# Patient Record
Sex: MALE | Race: BLACK OR AFRICAN AMERICAN | NOT HISPANIC OR LATINO
[De-identification: names, ages, dates, MRNs, and addresses within clinical notes are randomized per-mention and may not be internally consistent; named-entity substitution may affect disease eponyms.]

---

## 2018-04-03 PROBLEM — Z00.00 ENCOUNTER FOR PREVENTIVE HEALTH EXAMINATION: Status: ACTIVE | Noted: 2018-04-03

## 2018-04-19 ENCOUNTER — APPOINTMENT (OUTPATIENT)
Dept: ENDOCRINOLOGY | Facility: CLINIC | Age: 78
End: 2018-04-19

## 2021-04-22 ENCOUNTER — APPOINTMENT (OUTPATIENT)
Dept: SURGERY | Facility: CLINIC | Age: 81
End: 2021-04-22
Payer: MEDICARE

## 2021-04-22 VITALS
HEART RATE: 89 BPM | DIASTOLIC BLOOD PRESSURE: 70 MMHG | WEIGHT: 232 LBS | SYSTOLIC BLOOD PRESSURE: 143 MMHG | HEIGHT: 66 IN | BODY MASS INDEX: 37.28 KG/M2 | TEMPERATURE: 96.9 F | OXYGEN SATURATION: 96 %

## 2021-04-22 DIAGNOSIS — Z86.79 PERSONAL HISTORY OF OTHER DISEASES OF THE CIRCULATORY SYSTEM: ICD-10-CM

## 2021-04-22 DIAGNOSIS — Z82.49 FAMILY HISTORY OF ISCHEMIC HEART DISEASE AND OTHER DISEASES OF THE CIRCULATORY SYSTEM: ICD-10-CM

## 2021-04-22 DIAGNOSIS — Z86.39 PERSONAL HISTORY OF OTHER ENDOCRINE, NUTRITIONAL AND METABOLIC DISEASE: ICD-10-CM

## 2021-04-22 DIAGNOSIS — Z78.9 OTHER SPECIFIED HEALTH STATUS: ICD-10-CM

## 2021-04-22 DIAGNOSIS — L02.213 CUTANEOUS ABSCESS OF CHEST WALL: ICD-10-CM

## 2021-04-22 DIAGNOSIS — Z87.891 PERSONAL HISTORY OF NICOTINE DEPENDENCE: ICD-10-CM

## 2021-04-22 PROCEDURE — 99203 OFFICE O/P NEW LOW 30 MIN: CPT

## 2021-04-22 PROCEDURE — 99072 ADDL SUPL MATRL&STAF TM PHE: CPT

## 2021-04-22 RX ORDER — AMLODIPINE BESYLATE 10 MG/1
10 TABLET ORAL
Qty: 90 | Refills: 0 | Status: ACTIVE | COMMUNITY
Start: 2021-04-14

## 2021-04-22 RX ORDER — ATORVASTATIN CALCIUM 80 MG/1
80 TABLET, FILM COATED ORAL
Qty: 90 | Refills: 0 | Status: ACTIVE | COMMUNITY
Start: 2021-02-25

## 2021-04-22 RX ORDER — HYDROCHLOROTHIAZIDE 25 MG/1
25 TABLET ORAL
Qty: 90 | Refills: 0 | Status: ACTIVE | COMMUNITY
Start: 2021-04-12

## 2021-04-22 RX ORDER — METFORMIN HYDROCHLORIDE 1000 MG/1
1000 TABLET, COATED ORAL
Qty: 180 | Refills: 0 | Status: ACTIVE | COMMUNITY
Start: 2021-04-09

## 2021-04-22 RX ORDER — DAPAGLIFLOZIN 10 MG/1
10 TABLET, FILM COATED ORAL
Qty: 30 | Refills: 0 | Status: ACTIVE | COMMUNITY
Start: 2021-04-08

## 2021-04-22 RX ORDER — MELOXICAM 7.5 MG/1
7.5 TABLET ORAL
Qty: 30 | Refills: 0 | Status: ACTIVE | COMMUNITY
Start: 2021-03-02

## 2021-04-22 RX ORDER — KETOROLAC TROMETHAMINE 5 MG/ML
0.5 SOLUTION OPHTHALMIC
Qty: 5 | Refills: 0 | Status: ACTIVE | COMMUNITY
Start: 2021-04-02

## 2021-04-22 RX ORDER — CEPHALEXIN 500 MG/1
500 CAPSULE ORAL
Qty: 14 | Refills: 0 | Status: ACTIVE | COMMUNITY
Start: 2021-04-13

## 2021-04-22 RX ORDER — METOPROLOL SUCCINATE 50 MG/1
50 TABLET, EXTENDED RELEASE ORAL
Qty: 90 | Refills: 0 | Status: ACTIVE | COMMUNITY
Start: 2021-02-28

## 2021-04-22 RX ORDER — BLOOD SUGAR DIAGNOSTIC
STRIP MISCELLANEOUS
Qty: 100 | Refills: 0 | Status: ACTIVE | COMMUNITY
Start: 2021-02-05

## 2021-04-22 RX ORDER — ALBUTEROL SULFATE 90 UG/1
108 (90 BASE) INHALANT RESPIRATORY (INHALATION)
Qty: 7 | Refills: 0 | Status: ACTIVE | COMMUNITY
Start: 2021-02-11

## 2021-04-22 RX ORDER — PREDNISOLONE ACETATE 10 MG/ML
1 SUSPENSION/ DROPS OPHTHALMIC
Qty: 15 | Refills: 0 | Status: ACTIVE | COMMUNITY
Start: 2021-02-17

## 2021-04-22 RX ORDER — OFLOXACIN 3 MG/ML
0.3 SOLUTION/ DROPS OPHTHALMIC
Qty: 5 | Refills: 0 | Status: ACTIVE | COMMUNITY
Start: 2021-04-02

## 2021-04-22 RX ORDER — LOSARTAN POTASSIUM 100 MG/1
100 TABLET, FILM COATED ORAL
Qty: 90 | Refills: 0 | Status: ACTIVE | COMMUNITY
Start: 2021-02-06

## 2021-04-22 RX ORDER — TIZANIDINE 4 MG/1
4 TABLET ORAL
Qty: 30 | Refills: 0 | Status: ACTIVE | COMMUNITY
Start: 2021-03-02

## 2021-04-22 NOTE — HISTORY OF PRESENT ILLNESS
[de-identified] : right axillary abscess [de-identified] : 80 year old black male who presents c/o a right axillary abscess he has had for the past few weeks. Pt states he has had chronic drainage, without fevers or chills.

## 2021-04-22 NOTE — PHYSICAL EXAM
[Normal Breath Sounds] : Normal breath sounds [Normal Heart Sounds] : normal heart sounds [Purpura] : no purpura  [Petechiae] : no petechiae [Skin Ulcer] : ulcer [Skin Induration] : induration [Alert] : alert [Oriented to Person] : oriented to person [Oriented to Place] : oriented to place [Oriented to Time] : oriented to time [Calm] : calm [de-identified] : well developed black male in no acute distress [de-identified] : noninjected and nonicteric [de-identified] : witout adenopathy [de-identified] : without 4 by 4 cm abscess cavity that was opened and draining [de-identified] : benign [de-identified] : iwthout calf painor swelling

## 2021-04-29 ENCOUNTER — APPOINTMENT (OUTPATIENT)
Dept: SURGERY | Facility: CLINIC | Age: 81
End: 2021-04-29
Payer: MEDICARE

## 2021-04-29 VITALS — TEMPERATURE: 96.8 F

## 2021-04-29 PROCEDURE — 99072 ADDL SUPL MATRL&STAF TM PHE: CPT

## 2021-04-29 PROCEDURE — 99212 OFFICE O/P EST SF 10 MIN: CPT

## 2021-04-29 NOTE — PHYSICAL EXAM
[Normal Breath Sounds] : Normal breath sounds [Normal Heart Sounds] : normal heart sounds [Calm] : calm [de-identified] : well developed black male in no acute distress [de-identified] : abscess opened and closing, without erythema [de-identified] : benign [de-identified] : without hernia

## 2021-04-29 NOTE — HISTORY OF PRESENT ILLNESS
[de-identified] : right axillary abscess [de-identified] : pt improved, decreased swelling, decreased drainage, denies fevers or chills.

## 2021-05-03 ENCOUNTER — INPATIENT (INPATIENT)
Facility: HOSPITAL | Age: 81
LOS: 3 days | Discharge: HOME | DRG: 872 | End: 2021-05-07
Attending: INTERNAL MEDICINE | Admitting: INTERNAL MEDICINE
Payer: MEDICARE

## 2021-05-03 VITALS
TEMPERATURE: 97 F | SYSTOLIC BLOOD PRESSURE: 99 MMHG | HEIGHT: 67 IN | WEIGHT: 225.09 LBS | HEART RATE: 99 BPM | OXYGEN SATURATION: 95 % | DIASTOLIC BLOOD PRESSURE: 63 MMHG | RESPIRATION RATE: 26 BRPM

## 2021-05-03 DIAGNOSIS — K81.9 CHOLECYSTITIS, UNSPECIFIED: ICD-10-CM

## 2021-05-03 LAB
ALBUMIN SERPL ELPH-MCNC: 2.7 G/DL — LOW (ref 3.3–5)
ALP SERPL-CCNC: 130 U/L — HIGH (ref 40–120)
ALT FLD-CCNC: 23 U/L — SIGNIFICANT CHANGE UP (ref 12–78)
ANION GAP SERPL CALC-SCNC: 14 MMOL/L — SIGNIFICANT CHANGE UP (ref 5–17)
APPEARANCE UR: ABNORMAL
APTT BLD: 29.6 SEC — SIGNIFICANT CHANGE UP (ref 27.5–35.5)
AST SERPL-CCNC: 30 U/L — SIGNIFICANT CHANGE UP (ref 15–37)
BASOPHILS # BLD AUTO: 0.02 K/UL — SIGNIFICANT CHANGE UP (ref 0–0.2)
BASOPHILS NFR BLD AUTO: 0.1 % — SIGNIFICANT CHANGE UP (ref 0–2)
BILIRUB SERPL-MCNC: 1.2 MG/DL — SIGNIFICANT CHANGE UP (ref 0.2–1.2)
BILIRUB UR-MCNC: NEGATIVE — SIGNIFICANT CHANGE UP
BLD GP AB SCN SERPL QL: SIGNIFICANT CHANGE UP
BUN SERPL-MCNC: 35 MG/DL — HIGH (ref 7–23)
CALCIUM SERPL-MCNC: 8.3 MG/DL — LOW (ref 8.5–10.1)
CHLORIDE SERPL-SCNC: 96 MMOL/L — SIGNIFICANT CHANGE UP (ref 96–108)
CK MB BLD-MCNC: <1.4 % — SIGNIFICANT CHANGE UP (ref 0–3.5)
CK MB BLD-MCNC: <1.7 % — SIGNIFICANT CHANGE UP (ref 0–3.5)
CK MB CFR SERPL CALC: <1 NG/ML — SIGNIFICANT CHANGE UP (ref 0–3.6)
CK MB CFR SERPL CALC: <1 NG/ML — SIGNIFICANT CHANGE UP (ref 0–3.6)
CK SERPL-CCNC: 51 U/L — SIGNIFICANT CHANGE UP (ref 26–308)
CK SERPL-CCNC: 58 U/L — SIGNIFICANT CHANGE UP (ref 26–308)
CK SERPL-CCNC: 70 U/L — SIGNIFICANT CHANGE UP (ref 26–308)
CO2 SERPL-SCNC: 24 MMOL/L — SIGNIFICANT CHANGE UP (ref 22–31)
COLOR SPEC: YELLOW — SIGNIFICANT CHANGE UP
CREAT SERPL-MCNC: 2.5 MG/DL — HIGH (ref 0.5–1.3)
DIFF PNL FLD: ABNORMAL
EOSINOPHIL # BLD AUTO: 0.01 K/UL — SIGNIFICANT CHANGE UP (ref 0–0.5)
EOSINOPHIL NFR BLD AUTO: 0.1 % — SIGNIFICANT CHANGE UP (ref 0–6)
GLUCOSE SERPL-MCNC: 366 MG/DL — HIGH (ref 70–99)
GLUCOSE UR QL: 1000 MG/DL
HCT VFR BLD CALC: 35.5 % — LOW (ref 39–50)
HGB BLD-MCNC: 12 G/DL — LOW (ref 13–17)
IMM GRANULOCYTES NFR BLD AUTO: 1.6 % — HIGH (ref 0–1.5)
INR BLD: 1.14 RATIO — SIGNIFICANT CHANGE UP (ref 0.88–1.16)
KETONES UR-MCNC: ABNORMAL
LACTATE SERPL-SCNC: 1.4 MMOL/L — SIGNIFICANT CHANGE UP (ref 0.7–2)
LACTATE SERPL-SCNC: 4.5 MMOL/L — CRITICAL HIGH (ref 0.7–2)
LEUKOCYTE ESTERASE UR-ACNC: ABNORMAL
LIDOCAIN IGE QN: 119 U/L — SIGNIFICANT CHANGE UP (ref 73–393)
LYMPHOCYTES # BLD AUTO: 1.28 K/UL — SIGNIFICANT CHANGE UP (ref 1–3.3)
LYMPHOCYTES # BLD AUTO: 6.5 % — LOW (ref 13–44)
MCHC RBC-ENTMCNC: 30.3 PG — SIGNIFICANT CHANGE UP (ref 27–34)
MCHC RBC-ENTMCNC: 33.8 GM/DL — SIGNIFICANT CHANGE UP (ref 32–36)
MCV RBC AUTO: 89.6 FL — SIGNIFICANT CHANGE UP (ref 80–100)
MONOCYTES # BLD AUTO: 1.22 K/UL — HIGH (ref 0–0.9)
MONOCYTES NFR BLD AUTO: 6.2 % — SIGNIFICANT CHANGE UP (ref 2–14)
NEUTROPHILS # BLD AUTO: 16.78 K/UL — HIGH (ref 1.8–7.4)
NEUTROPHILS NFR BLD AUTO: 85.5 % — HIGH (ref 43–77)
NITRITE UR-MCNC: NEGATIVE — SIGNIFICANT CHANGE UP
NRBC # BLD: 0 /100 WBCS — SIGNIFICANT CHANGE UP (ref 0–0)
PH UR: 5 — SIGNIFICANT CHANGE UP (ref 5–8)
PLATELET # BLD AUTO: 228 K/UL — SIGNIFICANT CHANGE UP (ref 150–400)
POTASSIUM SERPL-MCNC: 3.4 MMOL/L — LOW (ref 3.5–5.3)
POTASSIUM SERPL-SCNC: 3.4 MMOL/L — LOW (ref 3.5–5.3)
PROT SERPL-MCNC: 8.3 G/DL — SIGNIFICANT CHANGE UP (ref 6–8.3)
PROT UR-MCNC: 30 MG/DL
PROTHROM AB SERPL-ACNC: 13.3 SEC — SIGNIFICANT CHANGE UP (ref 10.6–13.6)
RBC # BLD: 3.96 M/UL — LOW (ref 4.2–5.8)
RBC # FLD: 15 % — HIGH (ref 10.3–14.5)
SARS-COV-2 RNA SPEC QL NAA+PROBE: SIGNIFICANT CHANGE UP
SODIUM SERPL-SCNC: 134 MMOL/L — LOW (ref 135–145)
SP GR SPEC: 1.01 — SIGNIFICANT CHANGE UP (ref 1.01–1.02)
TROPONIN I SERPL-MCNC: <.015 NG/ML — SIGNIFICANT CHANGE UP (ref 0.01–0.04)
UROBILINOGEN FLD QL: NEGATIVE — SIGNIFICANT CHANGE UP
WBC # BLD: 19.63 K/UL — HIGH (ref 3.8–10.5)
WBC # FLD AUTO: 19.63 K/UL — HIGH (ref 3.8–10.5)

## 2021-05-03 PROCEDURE — 76705 ECHO EXAM OF ABDOMEN: CPT | Mod: 26

## 2021-05-03 PROCEDURE — 93010 ELECTROCARDIOGRAM REPORT: CPT | Mod: 76

## 2021-05-03 PROCEDURE — 71045 X-RAY EXAM CHEST 1 VIEW: CPT | Mod: 26

## 2021-05-03 PROCEDURE — 74176 CT ABD & PELVIS W/O CONTRAST: CPT | Mod: 26,MD

## 2021-05-03 PROCEDURE — 99223 1ST HOSP IP/OBS HIGH 75: CPT

## 2021-05-03 PROCEDURE — 99285 EMERGENCY DEPT VISIT HI MDM: CPT

## 2021-05-03 RX ORDER — METOPROLOL TARTRATE 50 MG
50 TABLET ORAL DAILY
Refills: 0 | Status: DISCONTINUED | OUTPATIENT
Start: 2021-05-03 | End: 2021-05-07

## 2021-05-03 RX ORDER — DEXTROSE 50 % IN WATER 50 %
15 SYRINGE (ML) INTRAVENOUS ONCE
Refills: 0 | Status: DISCONTINUED | OUTPATIENT
Start: 2021-05-03 | End: 2021-05-07

## 2021-05-03 RX ORDER — ACETAMINOPHEN 500 MG
650 TABLET ORAL EVERY 6 HOURS
Refills: 0 | Status: DISCONTINUED | OUTPATIENT
Start: 2021-05-03 | End: 2021-05-07

## 2021-05-03 RX ORDER — HEPARIN SODIUM 5000 [USP'U]/ML
5000 INJECTION INTRAVENOUS; SUBCUTANEOUS EVERY 12 HOURS
Refills: 0 | Status: DISCONTINUED | OUTPATIENT
Start: 2021-05-03 | End: 2021-05-04

## 2021-05-03 RX ORDER — ASPIRIN/CALCIUM CARB/MAGNESIUM 324 MG
81 TABLET ORAL DAILY
Refills: 0 | Status: DISCONTINUED | OUTPATIENT
Start: 2021-05-04 | End: 2021-05-07

## 2021-05-03 RX ORDER — DEXTROSE 50 % IN WATER 50 %
25 SYRINGE (ML) INTRAVENOUS ONCE
Refills: 0 | Status: DISCONTINUED | OUTPATIENT
Start: 2021-05-03 | End: 2021-05-07

## 2021-05-03 RX ORDER — SODIUM CHLORIDE 9 MG/ML
1000 INJECTION INTRAMUSCULAR; INTRAVENOUS; SUBCUTANEOUS
Refills: 0 | Status: DISCONTINUED | OUTPATIENT
Start: 2021-05-03 | End: 2021-05-07

## 2021-05-03 RX ORDER — SODIUM CHLORIDE 9 MG/ML
1000 INJECTION INTRAMUSCULAR; INTRAVENOUS; SUBCUTANEOUS ONCE
Refills: 0 | Status: COMPLETED | OUTPATIENT
Start: 2021-05-03 | End: 2021-05-03

## 2021-05-03 RX ORDER — ATORVASTATIN CALCIUM 80 MG/1
80 TABLET, FILM COATED ORAL AT BEDTIME
Refills: 0 | Status: DISCONTINUED | OUTPATIENT
Start: 2021-05-03 | End: 2021-05-07

## 2021-05-03 RX ORDER — SODIUM CHLORIDE 9 MG/ML
1000 INJECTION, SOLUTION INTRAVENOUS
Refills: 0 | Status: DISCONTINUED | OUTPATIENT
Start: 2021-05-03 | End: 2021-05-07

## 2021-05-03 RX ORDER — MORPHINE SULFATE 50 MG/1
2 CAPSULE, EXTENDED RELEASE ORAL ONCE
Refills: 0 | Status: DISCONTINUED | OUTPATIENT
Start: 2021-05-03 | End: 2021-05-03

## 2021-05-03 RX ORDER — PIPERACILLIN AND TAZOBACTAM 4; .5 G/20ML; G/20ML
3.38 INJECTION, POWDER, LYOPHILIZED, FOR SOLUTION INTRAVENOUS ONCE
Refills: 0 | Status: COMPLETED | OUTPATIENT
Start: 2021-05-03 | End: 2021-05-03

## 2021-05-03 RX ORDER — OXYCODONE AND ACETAMINOPHEN 5; 325 MG/1; MG/1
1 TABLET ORAL EVERY 4 HOURS
Refills: 0 | Status: DISCONTINUED | OUTPATIENT
Start: 2021-05-03 | End: 2021-05-07

## 2021-05-03 RX ORDER — OXYCODONE AND ACETAMINOPHEN 5; 325 MG/1; MG/1
2 TABLET ORAL EVERY 6 HOURS
Refills: 0 | Status: DISCONTINUED | OUTPATIENT
Start: 2021-05-03 | End: 2021-05-07

## 2021-05-03 RX ORDER — GLUCAGON INJECTION, SOLUTION 0.5 MG/.1ML
1 INJECTION, SOLUTION SUBCUTANEOUS ONCE
Refills: 0 | Status: DISCONTINUED | OUTPATIENT
Start: 2021-05-03 | End: 2021-05-07

## 2021-05-03 RX ORDER — MORPHINE SULFATE 50 MG/1
2 CAPSULE, EXTENDED RELEASE ORAL EVERY 6 HOURS
Refills: 0 | Status: DISCONTINUED | OUTPATIENT
Start: 2021-05-03 | End: 2021-05-07

## 2021-05-03 RX ORDER — DEXTROSE 50 % IN WATER 50 %
12.5 SYRINGE (ML) INTRAVENOUS ONCE
Refills: 0 | Status: DISCONTINUED | OUTPATIENT
Start: 2021-05-03 | End: 2021-05-07

## 2021-05-03 RX ORDER — ASPIRIN/CALCIUM CARB/MAGNESIUM 324 MG
162 TABLET ORAL ONCE
Refills: 0 | Status: COMPLETED | OUTPATIENT
Start: 2021-05-03 | End: 2021-05-03

## 2021-05-03 RX ORDER — INSULIN LISPRO 100/ML
VIAL (ML) SUBCUTANEOUS
Refills: 0 | Status: DISCONTINUED | OUTPATIENT
Start: 2021-05-03 | End: 2021-05-07

## 2021-05-03 RX ORDER — PIPERACILLIN AND TAZOBACTAM 4; .5 G/20ML; G/20ML
3.38 INJECTION, POWDER, LYOPHILIZED, FOR SOLUTION INTRAVENOUS EVERY 8 HOURS
Refills: 0 | Status: DISCONTINUED | OUTPATIENT
Start: 2021-05-03 | End: 2021-05-07

## 2021-05-03 RX ADMIN — MORPHINE SULFATE 2 MILLIGRAM(S): 50 CAPSULE, EXTENDED RELEASE ORAL at 19:16

## 2021-05-03 RX ADMIN — PIPERACILLIN AND TAZOBACTAM 200 GRAM(S): 4; .5 INJECTION, POWDER, LYOPHILIZED, FOR SOLUTION INTRAVENOUS at 14:30

## 2021-05-03 RX ADMIN — SODIUM CHLORIDE 1000 MILLILITER(S): 9 INJECTION INTRAMUSCULAR; INTRAVENOUS; SUBCUTANEOUS at 14:05

## 2021-05-03 RX ADMIN — PIPERACILLIN AND TAZOBACTAM 25 GRAM(S): 4; .5 INJECTION, POWDER, LYOPHILIZED, FOR SOLUTION INTRAVENOUS at 21:17

## 2021-05-03 RX ADMIN — MORPHINE SULFATE 2 MILLIGRAM(S): 50 CAPSULE, EXTENDED RELEASE ORAL at 18:57

## 2021-05-03 RX ADMIN — SODIUM CHLORIDE 1000 MILLILITER(S): 9 INJECTION INTRAMUSCULAR; INTRAVENOUS; SUBCUTANEOUS at 18:10

## 2021-05-03 RX ADMIN — Medication 162 MILLIGRAM(S): at 13:01

## 2021-05-03 RX ADMIN — PIPERACILLIN AND TAZOBACTAM 3.38 GRAM(S): 4; .5 INJECTION, POWDER, LYOPHILIZED, FOR SOLUTION INTRAVENOUS at 15:00

## 2021-05-03 RX ADMIN — SODIUM CHLORIDE 1000 MILLILITER(S): 9 INJECTION INTRAMUSCULAR; INTRAVENOUS; SUBCUTANEOUS at 15:05

## 2021-05-03 RX ADMIN — ATORVASTATIN CALCIUM 80 MILLIGRAM(S): 80 TABLET, FILM COATED ORAL at 21:17

## 2021-05-03 RX ADMIN — MORPHINE SULFATE 2 MILLIGRAM(S): 50 CAPSULE, EXTENDED RELEASE ORAL at 23:47

## 2021-05-03 RX ADMIN — MORPHINE SULFATE 2 MILLIGRAM(S): 50 CAPSULE, EXTENDED RELEASE ORAL at 23:17

## 2021-05-03 NOTE — CONSULT NOTE ADULT - ATTENDING COMMENTS
known cad s/p cabg  is not clear if he had sxs prior to cabg, and sounds like he did not  now presents with abd discomfort and pronounced stt abns  these occur in context of recent tate, since "February"  cannot say whether tate reflects body habitus and deconditioning, or sx of structural heart disease  cannot say if ekg changes reflect hypotension and infection, or whether there is more of an ischemic issue. first trop neg  cycle ce, check echo and check ppm, trend ekg  if needs emergent surgery, may proceed  if other than emergent surgery, given the lack of comprehensive understanding of the nature of the dyspnea and ekg changes, would try to manage conservatively until more data can be gathered  cautious iff in setting of sepsis

## 2021-05-03 NOTE — CONSULT NOTE ADULT - SUBJECTIVE AND OBJECTIVE BOX
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 Patient states that he started feeling unwell on Friday; he describes a vague abdominal pain in a belt like distribution with associated nausea and NBNB vomiting on Friday. He reports that his symptoms spontaneously resolved until 2 AM this morning when he developed sudden onset right sided pain, in the distribution of the right side of his chest wall down to the right side of his abdomen. He states that the pain was severe, however, he did not take anything for the pain and that by the time he got to his PCP's office, the pain had become about a 2/10. He states that his PCP took and EKG, which showed T wave inversions in the lateral leads, which was different from his prior EKGs, which concerned her and prompted her to urge him to go to the ER.        PMHx: as above  SurgHx: CABG, s/p PPM  Social Hx: former [employer] user  Allergies: No Known Allergies        Vitals: T(F): 98.7 (05-03-21 @ 17:00), Max: 98.7 (05-03-21 @ 17:00)  HR: 74 (05-03-21 @ 17:00) (74 - 99)  BP: 144/76 (05-03-21 @ 17:00) (99/63 - 144/76)  RR: 16 (05-03-21 @ 17:00) (16 - 26)  SpO2: 99% (05-03-21 @ 17:00) (94% - 99%)      Labs:                       12.0   19.63 )-----------( 228      ( 03 May 2021 12:50 )             35.5   05-03    134<L>  |  96  |  35<H>  ----------------------------<  366<H>  3.4<L>   |  24  |  2.50<H>    Ca    8.3<L>      03 May 2021 14:06    TPro  8.3  /  Alb  2.7<L>  /  TBili  1.2  /  DBili  x   /  AST  30  /  ALT  23  /  AlkPhos  130<H>  05-03    Lactate, Blood: 4.5 mmol/L (05.03.21 @ 14:06)     Radiology:  US Abdomen Limited (05.03.21 @ 15:39)  INTERPRETATION:  CLINICAL INFORMATION: Right upper quadrant abdominal pain.    COMPARISON: None available.    TECHNIQUE: Right upper quadrant sonography.    FINDINGS:    Evaluation is limited due to overlying bowel gas and patient's body habitus.    There is limited visualization of the liver.    There is a thickened gallbladder wall measuring 7 mm. There is dependent echogenic biliary sludge with probablesmall shadowing gallstones  There is a 3 mm, nonmobile, nonshadowing echogenic focus adherent to the gallbladder wall which could reflect gallbladder polyp.    The visualized common bile duct is normal in caliber measuring 5 mm.    The pancreas is not adequately visualized.    The right kidney measures 11.8 cm in length. No hydronephrosis.  Small renal cyst.    The abdominal aorta and IVC are not adequately visualized.    No abdominal ascites is noted.    IMPRESSION:    Limited study.    Gallbladder wall thickening with dependent biliary sludge and probable small dependent gallstones.    Possible small gallbladder polyp.    If there is a clinical suspicion for acute cholecystitis, recommend further evaluation with nuclear medicine HIDA scan.      Physical Exam:  General: AAO x 3, No acute distress  Skin: no jaundice, no icterus  Abdomen: large protuberant abdomen, soft, RUQ tenderness, distended, mildly tympanic, +bowel sounds, no incisional scars, +May's sign  Extremities: no edema in LE bilaterally, dry flaky skin

## 2021-05-03 NOTE — ED ADULT NURSE REASSESSMENT NOTE - NS ED NURSE REASSESS COMMENT FT1
Plan for tele admission. Patient made aware. Awaiting admission assessment and orders. Awaiting COVID result. Awaiting bed assignment. Safety maintained.

## 2021-05-03 NOTE — CONSULT NOTE ADULT - ASSESSMENT
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      1. Recommend medical admission  2. Recommend antibiotics  3. Will likely need OR during hospitalization  4. Cardiology consult appreciated, patient is pending ECHO  5. Medical & Cardiac optimization for possible OR  6. IV Hydration & recheck lactate level  7. NPO  8. VTE PPx  9. Discussed with Dr. Welsh who agrees

## 2021-05-03 NOTE — ED ADULT NURSE REASSESSMENT NOTE - NS ED NURSE REASSESS COMMENT FT1
Second cardiac enzyme collected and sent to the lab at this time. Repeat lactate collected and sent to the lab at this time. Type and screen collected and sent to the lab. Report given to tele, awaiting transport. Patient and wife aware. Safety maintained.

## 2021-05-03 NOTE — ED ADULT NURSE REASSESSMENT NOTE - NS ED NURSE REASSESS COMMENT FT1
Patient very difficulty stick for IV line and labs. IV obtained by RN at this time after attempts. Labs completed. Unable to collect blood cultures. Lab contacted and will come to ED to attempt blood draw. OK to administer IV zosyn as per ED MD Cornejo.

## 2021-05-03 NOTE — CONSULT NOTE ADULT - SUBJECTIVE AND OBJECTIVE BOX
Patient is a 80y old  Male who presents with a chief complaint of SOB     HPI:  The patient is a 80 year old male with PMH       PAST MEDICAL & SURGICAL HISTORY:  HTN (hypertension)    Diabetes              ECHO  FINDINGS:      MEDICATIONS  (STANDING):  aspirin  chewable 162 milliGRAM(s) Oral once  sodium chloride 0.9% Bolus 1000 milliLiter(s) IV Bolus once    MEDICATIONS  (PRN):      FAMILY HISTORY:    Denies Family history of CAD or early MI      Constitutional: denies fever, chills  HEENT: denies blurry vision, difficulty hearing  Respiratory: denies SOB, QUIROS, cough  Cardiovascular: denies CP, palpitations, orthopnea, PND, LE edema  Gastrointestinal: denies nausea, vomiting, abdominal pain  Genitourinary: denies urinary changes  Skin: Denies rashes, itching  Neurologic: denies headache, weakness, dizziness  Hematology/Oncology: denies bleeding, easy bruising  ROS negative except as noted above      SOCIAL HISTORY:    No tobacco, Alcohol or Ddrug use    Vital Signs Last 24 Hrs  T(C): 36.3 (03 May 2021 12:11), Max: 36.3 (03 May 2021 12:11)  T(F): 97.4 (03 May 2021 12:11), Max: 97.4 (03 May 2021 12:11)  HR: 99 (03 May 2021 12:11) (99 - 99)  BP: 99/63 (03 May 2021 12:11) (99/63 - 99/63)  BP(mean): --  RR: 26 (03 May 2021 12:11) (26 - 26)  SpO2: 95% (03 May 2021 12:11) (95% - 95%)    Physical Exam:  General: Well developed, well nourished, NAD  HEENT: NCAT, PERRLA, EOMI bl, moist mucous membranes   Neck: Supple, nontender, no mass  Neurology: A&Ox3, nonfocal, sensation intact   Respiratory: CTA B/L, No W/R/R  CV: RRR, +S1/S2, no murmurs, rubs or gallops  Abdominal: Soft, NT, ND +BSx4, no palpable masses  Extremities: No C/C/E, + peripheral pulses  MSK: Normal ROM, no joint erythema or warmth, no joint swelling   Heme: No obvious ecchymosis or petechiae   Skin: warm, dry, normal color      ECG:    I&O's Detail      LABS:                  I&O's Summary    BNP  RADIOLOGY & ADDITIONAL STUDIES: **** CHARTING IN PROGRESS ****     Patient is a 80y old  Male who presents with a chief complaint of SOB     HPI:  The patient is a 80 year old male with PMH       PAST MEDICAL & SURGICAL HISTORY:  HTN (hypertension)    Diabetes              ECHO  FINDINGS:      MEDICATIONS  (STANDING):  aspirin  chewable 162 milliGRAM(s) Oral once  sodium chloride 0.9% Bolus 1000 milliLiter(s) IV Bolus once    MEDICATIONS  (PRN):      FAMILY HISTORY:    Denies Family history of CAD or early MI      Constitutional: denies fever, chills  HEENT: denies blurry vision, difficulty hearing  Respiratory: denies SOB, QUIROS, cough  Cardiovascular: denies CP, palpitations, orthopnea, PND, LE edema  Gastrointestinal: denies nausea, vomiting, abdominal pain  Genitourinary: denies urinary changes  Skin: Denies rashes, itching  Neurologic: denies headache, weakness, dizziness  Hematology/Oncology: denies bleeding, easy bruising  ROS negative except as noted above      SOCIAL HISTORY:    No tobacco, Alcohol or Ddrug use    Vital Signs Last 24 Hrs  T(C): 36.3 (03 May 2021 12:11), Max: 36.3 (03 May 2021 12:11)  T(F): 97.4 (03 May 2021 12:11), Max: 97.4 (03 May 2021 12:11)  HR: 99 (03 May 2021 12:11) (99 - 99)  BP: 99/63 (03 May 2021 12:11) (99/63 - 99/63)  BP(mean): --  RR: 26 (03 May 2021 12:11) (26 - 26)  SpO2: 95% (03 May 2021 12:11) (95% - 95%)    Physical Exam:  General: Well developed, well nourished, NAD  HEENT: NCAT, PERRLA, EOMI bl, moist mucous membranes   Neck: Supple, nontender, no mass  Neurology: A&Ox3, nonfocal, sensation intact   Respiratory: CTA B/L, No W/R/R  CV: RRR, +S1/S2, no murmurs, rubs or gallops  Abdominal: Soft, NT, ND +BSx4, no palpable masses  Extremities: No C/C/E, + peripheral pulses  MSK: Normal ROM, no joint erythema or warmth, no joint swelling   Heme: No obvious ecchymosis or petechiae   Skin: warm, dry, normal color      ECG:    I&O's Detail      LABS:                  I&O's Summary    BNP  RADIOLOGY & ADDITIONAL STUDIES: **** CHARTING IN PROGRESS *****     Patient is a 80y old  Male who presents with a chief complaint of SOB     HPI:  The patient is a 80 year old male with PMH CAD s/p cardiac bypass in 2019, arrythmia s/p PPM placement in 2018, HLD, HTN, type 2 diabetes, COPD who presents to the ER after being sent in from his PMD for an abnormal EKG. Patient states that he started feeling unwell on Friday; he describes a vague abdominal pain in a belt like distribution with associated nausea and NBNB vomiting on Friday. He reports that his symptoms spontaneously resolved until 2 AM this morning when he developed sudden onset right sided pain, in the distribution down the right side of his chest wall down to the right side of his abdomen. He states that the pain was severe, however, he did not take anything for the pain and that by the time he got to his PCP's office, the pain had become about a 2/10. He states that his PCP took and EKG, which showed T wave inversions in the lateral leads, which was different from his prior EKGs, which concerned her and prompted her to urge him to go to the ER. He does endorse some QUIROS that has has been worsening since Friday; he states that he is unable to get from his home to his car in the driveway without becoming winded and SOB, which is unusual for him. He normally sleeps with three pillows at night, but denies SOB when he lies flat. He has lower extremity edema which is chronic for him. Patient denies any chest pain, palpitations, PND.     He sees Dr Pawel Thurston as his cardiologist. He last saw him in early April and was told that he was doing well, no changes at that time when he had an EKG. He last had a stress test and TTE done in summer of 2020 which was normal. He follows with Dr Pawel Roe/Dr Edwards in regards to his PPM, which was last interrogated in March with no events noted.       PAST MEDICAL & SURGICAL HISTORY:  HTN (hypertension)    Diabetes    CAD s/p bypass in 2019     HLD     COPD        HOME MEDICATIONS:   albuterol  Norvasc   Atorvastatin   Metoprolol succinate   Farxiga   Metformin  HCTZ   Losartan     MEDICATIONS  (STANDING):  aspirin  chewable 162 milliGRAM(s) Oral once  sodium chloride 0.9% Bolus 1000 milliLiter(s) IV Bolus once    MEDICATIONS  (PRN):      FAMILY HISTORY:    Denies Family history of CAD or early MI      Constitutional: denies fever, chills  HEENT: denies blurry vision, difficulty hearing  Respiratory: denies SOB, QUIROS, cough  Cardiovascular: denies CP, palpitations, orthopnea, PND, LE edema  Gastrointestinal: admits nausea, vomiting, right sided abdominal pain  Genitourinary: denies urinary changes  Skin: Denies rashes, itching  Neurologic: denies headache, weakness, dizziness  Hematology/Oncology: denies bleeding, easy bruising  ROS negative except as noted above      SOCIAL HISTORY:    Former smoker, smoked 1.5 ppd for > 50 years, denies ETOH use     Vital Signs Last 24 Hrs  T(C): 36.3 (03 May 2021 12:11), Max: 36.3 (03 May 2021 12:11)  T(F): 97.4 (03 May 2021 12:11), Max: 97.4 (03 May 2021 12:11)  HR: 99 (03 May 2021 12:11) (99 - 99)  BP: 99/63 (03 May 2021 12:11) (99/63 - 99/63)  RR: 26 (03 May 2021 12:11) (26 - 26)  SpO2: 95% (03 May 2021 12:11) (95% - 95%)    Physical Exam:  General: Well developed, well nourished, NAD  HEENT: NCAT, PERRLA, EOMI bl, moist mucous membranes   Neck: Supple, nontender, no mass  Neurology: A&Ox3, nonfocal, sensation intact   Respiratory: CTA B/L, No W/R/R  CV: RRR, +S1/S2, no murmurs, rubs or gallops  Abdominal: Soft, NT, ND +BSx4, no palpable masses  Extremities: No C/C/E, + peripheral pulses  MSK: Normal ROM, no joint erythema or warmth, no joint swelling   Heme: No obvious ecchymosis or petechiae   Skin: warm, dry, normal color      ECG: NSR, VR 91 bpm, t wave inversions in lateral leads     I&O's Detail      LABS:                  I&O's Summary    BNP  RADIOLOGY & ADDITIONAL STUDIES: **** CHARTING IN PROGRESS *****     Patient is a 80y old  Male who presents with a chief complaint of SOB     HPI:  The patient is a 80 year old male with PMH CAD s/p cardiac bypass in 2019, arrythmia s/p PPM placement in 2018, HLD, HTN, type 2 diabetes, COPD who presents to the ER after being sent in from his PMD for an abnormal EKG. Patient states that he started feeling unwell on Friday; he describes a vague abdominal pain in a belt like distribution with associated nausea and NBNB vomiting on Friday. He reports that his symptoms spontaneously resolved until 2 AM this morning when he developed sudden onset right sided pain, in the distribution down the right side of his chest wall down to the right side of his abdomen. He states that the pain was severe, however, he did not take anything for the pain and that by the time he got to his PCP's office, the pain had become about a 2/10. He states that his PCP took and EKG, which showed T wave inversions in the lateral leads, which was different from his prior EKGs, which concerned her and prompted her to urge him to go to the ER. He does endorse some QUIROS that has has been worsening since Friday; he states that he is unable to get from his home to his car in the driveway without becoming winded and SOB, which is unusual for him. He normally sleeps with three pillows at night, but denies SOB when he lies flat. He has lower extremity edema which is chronic for him. Patient denies any chest pain, palpitations, PND.     He sees Dr Pawel Thurston as his cardiologist. He last saw him in early April and was told that he was doing well, no changes at that time when he had an EKG. He last had a stress test and TTE done in summer of 2020 which was normal. He follows with Dr Pawel Roe/Dr Edwards in regards to his PPM, which was last interrogated in March with no events noted.       PAST MEDICAL & SURGICAL HISTORY:  HTN (hypertension)    Diabetes    CAD s/p bypass in 2019     HLD     COPD        HOME MEDICATIONS:   albuterol  Norvasc   Atorvastatin   Metoprolol succinate   Farxiga   Metformin  HCTZ   Losartan     MEDICATIONS  (STANDING):  aspirin  chewable 162 milliGRAM(s) Oral once  sodium chloride 0.9% Bolus 1000 milliLiter(s) IV Bolus once    MEDICATIONS  (PRN):      FAMILY HISTORY:    Family history of MI       Constitutional: denies fever, chills  HEENT: denies blurry vision, difficulty hearing  Respiratory: admits to QUIROS, SOB   Cardiovascular: denies CP, palpitations, orthopnea, PND, LE edema  Gastrointestinal: admits nausea, vomiting, right sided abdominal pain  Genitourinary: denies urinary changes  Skin: Denies rashes, itching  Neurologic: denies headache, weakness, dizziness  Hematology/Oncology: denies bleeding, easy bruising  ROS negative except as noted above      SOCIAL HISTORY:    Former smoker, smoked 1.5 ppd for > 50 years, denies ETOH use     Vital Signs Last 24 Hrs  T(C): 36.3 (03 May 2021 12:11), Max: 36.3 (03 May 2021 12:11)  T(F): 97.4 (03 May 2021 12:11), Max: 97.4 (03 May 2021 12:11)  HR: 99 (03 May 2021 12:11) (99 - 99)  BP: 99/63 (03 May 2021 12:11) (99/63 - 99/63)  RR: 26 (03 May 2021 12:11) (26 - 26)  SpO2: 95% (03 May 2021 12:11) (95% - 95%)    Physical Exam:  General: obese male, in NAD   HEENT: NCAT, PERRLA  Neck: Supple, nontender, no mass  Neurology: A&Ox3, nonfocal, sensation intact   Respiratory: CTA B/L, No W/R/R  CV: RRR, +S1/S2, no murmurs, rubs or gallops  Abdominal: Soft, NT, ND +BSx4, no palpable masses  Extremities: nonpirring LE edema noted with venous stasis changes   MSK: Normal ROM, no joint erythema or warmth, no joint swelling   Heme: No obvious ecchymosis or petechiae   Skin: warm, dry, normal color      ECG: NSR, VR 91 bpm, t wave inversions in lateral leads     I&O's Detail      LABS:                  I&O's Summary    BNP  RADIOLOGY & ADDITIONAL STUDIES: **** CHARTING IN PROGRESS *****     Patient is a 80y old  Male who presents with a chief complaint of SOB     HPI:  The patient is a 80 year old male with PMH CAD/NSTEMI s/p quadruple vessel cardiac bypass in 2018, complete heart block s/p PPM placement in 2018,  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 Patient states that he started feeling unwell on Friday; he describes a vague abdominal pain in a belt like distribution with associated nausea and NBNB vomiting on Friday. He reports that his symptoms spontaneously resolved until 2 AM this morning when he developed sudden onset right sided pain, in the distribution of the right side of his chest wall down to the right side of his abdomen. He states that the pain was severe, however, he did not take anything for the pain and that by the time he got to his PCP's office, the pain had become about a 2/10. He states that his PCP took and EKG, which showed T wave inversions in the lateral leads, which was different from his prior EKGs, which concerned her and prompted her to urge him to go to the ER. He does endorse some QUIROS that has been occurring for the past 2-3 months and has been worsening since Friday; he states that he is unable to get from his home to his car in the driveway without becoming winded and SOB, which is unusual for him. He normally sleeps with three pillows at night, but denies SOB when he lies flat. He has lower extremity edema which is chronic for him. Patient denies any chest pain, palpitations, PND.     He sees Dr Pawel Thurston as his cardiologist. Records obtained from his cardiologist's office and placed in chart. He last saw him in Feb 2021 and was told that he was doing well, no changes at that time when he had an EKG. He last had a TTE done in summer of 2020 which showed normal LV function, EF of 55%, mild MR and TR. He had a nuclear mycocardial perfusion study in November of 2019 which was negative for ischemia.  He follows with Dr Pawel Roe/Dr Edwards in regards to his PPM, which was last interrogated in March with no events noted.       PAST MEDICAL & SURGICAL HISTORY:  HTN (hypertension)    Diabetes    CAD s/p bypass in 2019     HLD     COPD        HOME MEDICATIONS:   albuterol  Norvasc   Atorvastatin   Metoprolol succinate   Farxiga   Metformin  HCTZ   Losartan   Lasix     MEDICATIONS  (STANDING):  aspirin  chewable 162 milliGRAM(s) Oral once  sodium chloride 0.9% Bolus 1000 milliLiter(s) IV Bolus once    MEDICATIONS  (PRN):      FAMILY HISTORY:    Family history of MI       Constitutional: denies fever, chills  HEENT: denies blurry vision, difficulty hearing  Respiratory: admits to QUIROS, SOB   Cardiovascular: denies CP, palpitations, orthopnea, PND, LE edema  Gastrointestinal: admits nausea, vomiting, right sided abdominal pain  Genitourinary: denies urinary changes  Skin: Denies rashes, itching  Neurologic: denies headache, weakness, dizziness  Hematology/Oncology: denies bleeding, easy bruising  ROS negative except as noted above      SOCIAL HISTORY:    Former smoker, smoked 1.5 ppd for > 50 years, denies ETOH use     Vital Signs Last 24 Hrs  T(C): 36.3 (03 May 2021 12:11), Max: 36.3 (03 May 2021 12:11)  T(F): 97.4 (03 May 2021 12:11), Max: 97.4 (03 May 2021 12:11)  HR: 99 (03 May 2021 12:11) (99 - 99)  BP: 99/63 (03 May 2021 12:11) (99/63 - 99/63)  RR: 26 (03 May 2021 12:11) (26 - 26)  SpO2: 95% (03 May 2021 12:11) (95% - 95%)    Physical Exam:  General: obese male, in NAD   HEENT: NCAT, PERRLA  Neck: Supple, nontender, no mass  Neurology: A&Ox3, nonfocal, sensation intact   Respiratory: CTA B/L, No W/R/R  CV: RRR, +S1/S2, no murmurs, rubs or gallops  Abdominal: Soft, NT, ND +BSx4, no palpable masses  Extremities: nonpirring LE edema noted with venous stasis changes   MSK: mild left upper and lower extremity weakness, residual from stroke   Heme: No obvious ecchymosis or petechiae   Skin: warm, dry, normal color      ECG: NSR, VR 91 bpm, t wave inversions in lateral leads     I&O's Detail      LABS:                  I&O's Summary    BNP  RADIOLOGY & ADDITIONAL STUDIES: Patient is a 80y old  Male who presents with a chief complaint of SOB     HPI:  The patient is a 80 year old male with PMH CAD/NSTEMI s/p quadruple vessel cardiac bypass in 2018, complete heart block s/p PPM placement in 2018,  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 Patient states that he started feeling unwell on Friday; he describes a vague abdominal pain in a belt like distribution with associated nausea and NBNB vomiting on Friday. He reports that his symptoms spontaneously resolved until 2 AM this morning when he developed sudden onset right sided pain, in the distribution of the right side of his chest wall down to the right side of his abdomen. He states that the pain was severe, however, he did not take anything for the pain and that by the time he got to his PCP's office, the pain had become about a 2/10. He states that his PCP took and EKG, which showed T wave inversions in the lateral leads, which was different from his prior EKGs, which concerned her and prompted her to urge him to go to the ER. He does endorse some QUIROS that has been occurring for the past 2-3 months and has been worsening since Friday; he states that he is unable to get from his home to his car in the driveway without becoming winded and SOB, which is unusual for him. He normally sleeps with three pillows at night, but denies SOB when he lies flat. He has lower extremity edema which is chronic for him. Patient denies any chest pain, palpitations, PND.     He sees Dr Pawel Thurston as his cardiologist. Records obtained from his cardiologist's office and placed in chart. He last saw him in Feb 2021 and was told that he was doing well, no changes at that time when he had an EKG.  EKG at that time was sinus rhythm with nonspecific ST abnormalities  He last had a TTE done in summer of 2020 which showed normal LV function, EF of 55%, mild MR and TR. He had a nuclear myocardial perfusion study in November of 2019 which was negative for ischemia.  He follows with Dr Pawel Roe/Dr Edwards in regards to his PPM, which was last interrogated in March with no events noted.       PAST MEDICAL & SURGICAL HISTORY:  HTN (hypertension)    Diabetes    CAD s/p bypass in 2019     HLD     COPD        HOME MEDICATIONS:   albuterol  Norvasc   Atorvastatin   Metoprolol succinate   Farxiga   Metformin  HCTZ   Losartan   Lasix     MEDICATIONS  (STANDING):  aspirin  chewable 162 milliGRAM(s) Oral once  sodium chloride 0.9% Bolus 1000 milliLiter(s) IV Bolus once    MEDICATIONS  (PRN):      FAMILY HISTORY:    Family history of MI       Constitutional: denies fever, chills  HEENT: denies blurry vision, difficulty hearing  Respiratory: admits to QUIROS, SOB   Cardiovascular: denies CP, palpitations, orthopnea, PND, LE edema  Gastrointestinal: admits nausea, vomiting, right sided abdominal pain  Genitourinary: denies urinary changes  Skin: Denies rashes, itching  Neurologic: denies headache, weakness, dizziness  Hematology/Oncology: denies bleeding, easy bruising  ROS negative except as noted above      SOCIAL HISTORY:    Former smoker, smoked 1.5 ppd for > 50 years, denies ETOH use     Vital Signs Last 24 Hrs  T(C): 36.3 (03 May 2021 12:11), Max: 36.3 (03 May 2021 12:11)  T(F): 97.4 (03 May 2021 12:11), Max: 97.4 (03 May 2021 12:11)  HR: 99 (03 May 2021 12:11) (99 - 99)  BP: 99/63 (03 May 2021 12:11) (99/63 - 99/63)  RR: 26 (03 May 2021 12:11) (26 - 26)  SpO2: 95% (03 May 2021 12:11) (95% - 95%)    Physical Exam:  General: obese male, in NAD   HEENT: NCAT, PERRLA  Neck: Supple, nontender, no mass  Neurology: A&Ox3, nonfocal, sensation intact   Respiratory: CTA B/L, No W/R/R  CV: RRR, +S1/S2, no murmurs, rubs or gallops  Abdominal: Soft, NT, ND +BSx4, no palpable masses  Extremities: nonpirring LE edema noted with venous stasis changes   MSK: mild left upper and lower extremity weakness, residual from stroke   Heme: No obvious ecchymosis or petechiae   Skin: warm, dry, normal color      ECG: NSR, VR 91 bpm, t wave inversions in lateral leads     I&O's Detail      LABS:                          12.0   19.63 )-----------( 228      ( 03 May 2021 12:50 )             35.5     03 May 2021 14:06    134    |  96     |  35     ----------------------------<  366    3.4     |  24     |  2.50     Ca    8.3        03 May 2021 14:06    TPro  8.3    /  Alb  2.7    /  TBili  1.2    /  DBili  x      /  AST  30     /  ALT  23     /  AlkPhos  130    03 May 2021 14:06    LIVER FUNCTIONS - ( 03 May 2021 14:06 )  Alb: 2.7 g/dL / Pro: 8.3 g/dL / ALK PHOS: 130 U/L / ALT: 23 U/L / AST: 30 U/L / GGT: x           PT/INR - ( 03 May 2021 14:48 )   PT: 13.3 sec;   INR: 1.14 ratio         PTT - ( 03 May 2021 14:48 )  PTT:29.6 sec  CARDIAC MARKERS ( 03 May 2021 14:06 )  <.015 ng/mL / x     / 70 U/L / x     / <1.0 ng/mL                    Patient is a 80y old  Male who presents with a chief complaint of SOB     HPI:  The patient is a 80 year old male, limited historian.  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 Patient states that he started feeling unwell on Friday; he describes a vague abdominal pain in a belt like distribution with associated nausea and NBNB vomiting on Friday. He reports that his symptoms spontaneously resolved until 2 AM this morning when he developed sudden onset right sided pain, in the distribution of the right side of his chest wall down to the right side of his abdomen. He states that the pain was severe, however, he did not take anything for the pain and that by the time he got to his PCP's office, the pain had become about a 2/10. He states that his PCP took and EKG, which showed T wave inversions in the lateral leads, which was different from his prior EKGs, which concerned her and prompted her to urge him to go to the ER. He does endorse some QUIROS that has been occurring for the past 2-3 months and has been worsening since Friday; he states that he is unable to get from his home to his car in the driveway without becoming winded and SOB, which is unusual for him. He normally sleeps with three pillows at night, but denies SOB when he lies flat. He has lower extremity edema which is chronic for him. Patient denies any chest pain, palpitations, PND.     He sees Dr Pawel Thurston as his cardiologist. Records obtained from his cardiologist's office and placed in chart. He last saw him in Feb 2021 and was told that he was doing well, no changes at that time when he had an EKG.  EKG at that time was sinus rhythm with nonspecific ST abnormalities  He last had a TTE done in summer of 2020 which showed normal LV function, EF of 55%, mild MR and TR. He had a nuclear myocardial perfusion study in November of 2019 which was negative for ischemia.  He follows with Dr Pawel Roe/Dr Edwards in regards to his PPM, which was last interrogated in March with no events noted.       PAST MEDICAL & SURGICAL HISTORY:  HTN (hypertension)    Diabetes    CAD s/p bypass in 2019     HLD     COPD        HOME MEDICATIONS:   albuterol  Norvasc   Atorvastatin   Metoprolol succinate   Farxiga   Metformin  HCTZ   Losartan   Lasix     MEDICATIONS  (STANDING):  aspirin  chewable 162 milliGRAM(s) Oral once  sodium chloride 0.9% Bolus 1000 milliLiter(s) IV Bolus once    MEDICATIONS  (PRN):      FAMILY HISTORY:    Family history of MI       Constitutional: denies fever, chills  HEENT: denies blurry vision, difficulty hearing  Respiratory: admits to QUIROS, SOB   Cardiovascular: denies CP, palpitations, orthopnea, PND, LE edema  Gastrointestinal: admits nausea, vomiting, right sided abdominal pain  Genitourinary: denies urinary changes  Skin: Denies rashes, itching  Neurologic: denies headache, weakness, dizziness  Hematology/Oncology: denies bleeding, easy bruising  ROS negative except as noted above      SOCIAL HISTORY:    Former smoker, smoked 1.5 ppd for > 50 years, denies ETOH use     Vital Signs Last 24 Hrs  T(C): 36.3 (03 May 2021 12:11), Max: 36.3 (03 May 2021 12:11)  T(F): 97.4 (03 May 2021 12:11), Max: 97.4 (03 May 2021 12:11)  HR: 99 (03 May 2021 12:11) (99 - 99)  BP: 99/63 (03 May 2021 12:11) (99/63 - 99/63)  RR: 26 (03 May 2021 12:11) (26 - 26)  SpO2: 95% (03 May 2021 12:11) (95% - 95%)    Physical Exam:  General: obese male, in NAD   HEENT: NCAT, PERRLA  Neck: Supple, nontender, no mass  Neurology: A&Ox3, nonfocal, sensation intact   Respiratory: CTA B/L, No W/R/R  CV: RRR, +S1/S2, no murmurs, rubs or gallops  Abdominal: Soft, NT, ND +BSx4, no palpable masses  Extremities: nonpirring LE edema noted with venous stasis changes   MSK: mild left upper and lower extremity weakness, residual from stroke   Heme: No obvious ecchymosis or petechiae   Skin: warm, dry, normal color      ECG: NSR, VR 91 bpm, t wave inversions in lateral leads     I&O's Detail      LABS:                          12.0   19.63 )-----------( 228      ( 03 May 2021 12:50 )             35.5     03 May 2021 14:06    134    |  96     |  35     ----------------------------<  366    3.4     |  24     |  2.50     Ca    8.3        03 May 2021 14:06    TPro  8.3    /  Alb  2.7    /  TBili  1.2    /  DBili  x      /  AST  30     /  ALT  23     /  AlkPhos  130    03 May 2021 14:06    LIVER FUNCTIONS - ( 03 May 2021 14:06 )  Alb: 2.7 g/dL / Pro: 8.3 g/dL / ALK PHOS: 130 U/L / ALT: 23 U/L / AST: 30 U/L / GGT: x           PT/INR - ( 03 May 2021 14:48 )   PT: 13.3 sec;   INR: 1.14 ratio         PTT - ( 03 May 2021 14:48 )  PTT:29.6 sec  CARDIAC MARKERS ( 03 May 2021 14:06 )  <.015 ng/mL / x     / 70 U/L / x     / <1.0 ng/mL

## 2021-05-03 NOTE — ED ADULT NURSE NOTE - CHIEF COMPLAINT QUOTE
Pt sent by [facility] office for diff breathing, hypotension, EKG changes

## 2021-05-03 NOTE — ED ADULT NURSE NOTE - NSIMPLEMENTINTERV_GEN_ALL_ED
Implemented All Fall Risk Interventions:  [place] to call system. Call bell, personal items and telephone within reach. Instruct patient to call for assistance. Room bathroom lighting operational. Non-slip footwear when patient is off stretcher. Physically safe environment: no spills, clutter or unnecessary equipment. Stretcher in lowest position, wheels locked, appropriate side rails in place. Provide visual cue, wrist band, yellow gown, etc. Monitor gait and stability. Monitor for mental status changes and reorient to person, place, and time. Review medications for side effects contributing to fall risk. Reinforce activity limits and safety measures with patient and family.

## 2021-05-03 NOTE — ED PROVIDER NOTE - PHYSICAL EXAMINATION
Gen: Well appearing in NAD  Head: NC/AT  Neck: trachea midline  cv: rrr, no leg swelling   Resp:  No distress, lungs clear  abd right upper quadrant tenderness  Ext: no deformities  Neuro:  A&O appears non focal  Skin:  Warm and dry as visualized  Psych:  Normal affect and mood

## 2021-05-03 NOTE — ED PROVIDER NOTE - CLINICAL SUMMARY MEDICAL DECISION MAKING FREE TEXT BOX
79yo M with right upper qudrant pain, sob and ekg changes, will eval for acs, nydia, labs, trop, lipase, ruq US , cards reassess

## 2021-05-03 NOTE — CONSULT NOTE ADULT - ASSESSMENT
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     Patient has a significant cardiac history, now found to have dynamic changes on EKG. When compared to prior EKG from his cardiologist Dr. Thurston's office in February of 2021, he was T wave inversions in the lateral leads which are more significant compared to prior.   - He is not complaining of active chest pain at this time, however he does endorse some worsening of his QUIROS since February of this year. This may be secondary to COPD/body habitus, however cannot rule out cardiac cause.   - He had a myocardial perfusion study in 11/2019 which showed so significant ischemia. Has not had stress test or cath since that time.   - Troponin negative, will obtain a set of full cardiac enzymes in 6 hours as cannot effectively rule out ischemia. Dynamic changes on EKG may be secondary to underlying infectious cause/hypotension, however cannot effectively rule out ischemia without further workup.   - Would continue his home aspirin and statin   - He is hypotensive, likely secondary to underlying infection, if blood pressures improve with fluid resuscitation, would start him on his home dose of  metoprolol. Will likely have to hold ARB in setting of ANITA.   - Last TTE done at Dr Thurston's office in June of 2020 shows EF of 55% and no significant valvular disease. Given dynamic EKG changes and worsening QUIROS, will order TTE.   - No evidence of volume overload. He is on Lasix at home for chronic LE edema, if blood pressures allow and kidney function improves, may start Lasix.   - He is mildly hypotensive, which may be secondary to underlying infection, continue home BP meds if blood pressure allows, monitor routine hemodynamics.  - He has a [employer] PPM, last interrogated in February. Unable to obtain reach his EP Dr Roe at [facility]. Called placed to [employer] to interrogate PPM.   - If patient requires emergent OR intervention for acute abdominal pathology, he has been optimized from a cardiac standpoint. If he is to go to the OR emergently, will need to be monitored in the ICU postoperatively.   - If patient does not require emergent OR intervention, would recommend to hold off on surgery until he has had an echocardiogram, had his PPM interrogated, and has been monitored closely on telemetry with serial EKGs.     - Monitor and replete lytes, keep K>4, Mg>2.  - Other cardiovascular workup will depend on clinical course. All other workup per primary team.  - Will follow. 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     Patient has a significant cardiac history, now found to have dynamic changes on EKG. When compared to prior EKG from his cardiologist Dr. Thurston's office in February of 2021, he was T wave inversions in the lateral leads which are more significant compared to prior.   - He is not complaining of active chest pain at this time, however he does endorse some worsening of his QUIROS since February of this year. This may be secondary to COPD/body habitus, however cannot rule out cardiac cause.   - He had a myocardial perfusion study in 11/2019 which showed so significant ischemia. Has not had stress test or cath since that time.   - Troponin negative, will obtain a set of full cardiac enzymes in 6 hours as cannot effectively rule out ischemia. Dynamic changes on EKG may be secondary to underlying infectious cause/hypotension, however cannot effectively rule out ischemia without further workup.   - Would continue his home aspirin and statin   - He is hypotensive, likely secondary to underlying infection, if blood pressures improve with fluid resuscitation, would start him on his home dose of  metoprolol. Will likely have to hold ARB in setting of ANITA.   - Last TTE done at Dr Thurston's office in June of 2020 shows EF of 55% and no significant valvular disease. Given dynamic EKG changes and worsening QUIROS, will order TTE.   - No evidence of volume overload. He is on Lasix at home for chronic LE edema, if blood pressures allow and kidney function improves, may start Lasix.   - He is mildly hypotensive, which may be secondary to underlying infection, continue home BP meds if blood pressure allows, monitor routine hemodynamics.  - He has a [employer] PPM, last interrogated in February. Unable to obtain reach his EP Dr Roe at [facility]. Called placed to [employer] to interrogate PPM.   - If patient requires emergent OR intervention for acute abdominal pathology, he has been optimized from a cardiac standpoint. If he is to go to the OR emergently, given the uncertainty surrounding his ekg changes, would consider postop icu monitoring.   - If patient does not require emergent OR intervention, would recommend to hold off on surgery until he has had an echocardiogram, had his PPM interrogated, and has been monitored closely on telemetry with serial EKGs.     - Monitor and replete lytes, keep K>4, Mg>2.  - Other cardiovascular workup will depend on clinical course. All other workup per primary team.  - Will follow.

## 2021-05-03 NOTE — ED PROVIDER NOTE - OBJECTIVE STATEMENT
81yo M ho DM, HTN, CAD, CABG, PPM, presents with 3 days of right sided pain, had nausea and vomiting 2 days ago, but that resolved. went to pcp today for this right sided pain who noted ekg changes with st depressions in lateral leads so sent to ED. pt states he has been having erratic breathing but not new. denies fevers, chills, cough. no abd surgeries, no urinary sx

## 2021-05-03 NOTE — ED ADULT NURSE NOTE - OBJECTIVE STATEMENT
80 year old male presents to the ED complaining of abdominal pain. Patient reports right upper quadrant abdominal pain, tender on exam. Patient admits pain started 3 days ago. Patient also reports nausea/vomiting x 2days. Patient went to his PMD today, Afia, who completed an EKG and sent him to the ED for EKG changes. Patient reports intermittent shortness of breath, mild difficulty breathing and tachypnea noted on initial RN assessment. Patient denies chest pain or palpitations. Patient denies recent illness, fever/chills. Patient denies sick contacts or known COVID exposure.

## 2021-05-04 DIAGNOSIS — Z95.1 PRESENCE OF AORTOCORONARY BYPASS GRAFT: Chronic | ICD-10-CM

## 2021-05-04 DIAGNOSIS — I25.10 ATHEROSCLEROTIC HEART DISEASE OF NATIVE CORONARY ARTERY WITHOUT ANGINA PECTORIS: ICD-10-CM

## 2021-05-04 DIAGNOSIS — K81.9 CHOLECYSTITIS, UNSPECIFIED: ICD-10-CM

## 2021-05-04 DIAGNOSIS — I10 ESSENTIAL (PRIMARY) HYPERTENSION: ICD-10-CM

## 2021-05-04 DIAGNOSIS — E11.9 TYPE 2 DIABETES MELLITUS WITHOUT COMPLICATIONS: ICD-10-CM

## 2021-05-04 DIAGNOSIS — Z95.0 PRESENCE OF CARDIAC PACEMAKER: ICD-10-CM

## 2021-05-04 DIAGNOSIS — N17.9 ACUTE KIDNEY FAILURE, UNSPECIFIED: ICD-10-CM

## 2021-05-04 DIAGNOSIS — R06.02 SHORTNESS OF BREATH: ICD-10-CM

## 2021-05-04 LAB
A1C WITH ESTIMATED AVERAGE GLUCOSE RESULT: 8.2 % — HIGH (ref 4–5.6)
ALBUMIN SERPL ELPH-MCNC: 2.5 G/DL — LOW (ref 3.3–5)
ALP SERPL-CCNC: 128 U/L — HIGH (ref 40–120)
ALT FLD-CCNC: 19 U/L — SIGNIFICANT CHANGE UP (ref 12–78)
ANION GAP SERPL CALC-SCNC: 11 MMOL/L — SIGNIFICANT CHANGE UP (ref 5–17)
AST SERPL-CCNC: 26 U/L — SIGNIFICANT CHANGE UP (ref 15–37)
BILIRUB DIRECT SERPL-MCNC: 0.4 MG/DL — HIGH (ref 0.05–0.2)
BILIRUB INDIRECT FLD-MCNC: 0.7 MG/DL — SIGNIFICANT CHANGE UP (ref 0.2–1)
BILIRUB SERPL-MCNC: 1.1 MG/DL — SIGNIFICANT CHANGE UP (ref 0.2–1.2)
BUN SERPL-MCNC: 30 MG/DL — HIGH (ref 7–23)
CALCIUM SERPL-MCNC: 7.7 MG/DL — LOW (ref 8.5–10.1)
CHLORIDE SERPL-SCNC: 104 MMOL/L — SIGNIFICANT CHANGE UP (ref 96–108)
CK SERPL-CCNC: 44 U/L — SIGNIFICANT CHANGE UP (ref 26–308)
CK SERPL-CCNC: 65 U/L — SIGNIFICANT CHANGE UP (ref 26–308)
CO2 SERPL-SCNC: 26 MMOL/L — SIGNIFICANT CHANGE UP (ref 22–31)
COVID-19 SPIKE DOMAIN AB INTERP: POSITIVE
COVID-19 SPIKE DOMAIN ANTIBODY RESULT: >250 U/ML — HIGH
CREAT SERPL-MCNC: 1.9 MG/DL — HIGH (ref 0.5–1.3)
ESTIMATED AVERAGE GLUCOSE: 189 MG/DL — HIGH (ref 68–114)
GLUCOSE SERPL-MCNC: 240 MG/DL — HIGH (ref 70–99)
HCT VFR BLD CALC: 31.6 % — LOW (ref 39–50)
HGB BLD-MCNC: 10.8 G/DL — LOW (ref 13–17)
MCHC RBC-ENTMCNC: 30.3 PG — SIGNIFICANT CHANGE UP (ref 27–34)
MCHC RBC-ENTMCNC: 34.2 GM/DL — SIGNIFICANT CHANGE UP (ref 32–36)
MCV RBC AUTO: 88.8 FL — SIGNIFICANT CHANGE UP (ref 80–100)
NRBC # BLD: 0 /100 WBCS — SIGNIFICANT CHANGE UP (ref 0–0)
PLATELET # BLD AUTO: 205 K/UL — SIGNIFICANT CHANGE UP (ref 150–400)
POTASSIUM SERPL-MCNC: 3.8 MMOL/L — SIGNIFICANT CHANGE UP (ref 3.5–5.3)
POTASSIUM SERPL-SCNC: 3.8 MMOL/L — SIGNIFICANT CHANGE UP (ref 3.5–5.3)
PROT SERPL-MCNC: 6.7 G/DL — SIGNIFICANT CHANGE UP (ref 6–8.3)
RBC # BLD: 3.56 M/UL — LOW (ref 4.2–5.8)
RBC # FLD: 15 % — HIGH (ref 10.3–14.5)
SARS-COV-2 IGG+IGM SERPL QL IA: >250 U/ML — HIGH
SARS-COV-2 IGG+IGM SERPL QL IA: POSITIVE
SODIUM SERPL-SCNC: 141 MMOL/L — SIGNIFICANT CHANGE UP (ref 135–145)
TROPONIN I SERPL-MCNC: <.015 NG/ML — SIGNIFICANT CHANGE UP (ref 0.01–0.04)
TROPONIN I SERPL-MCNC: <.015 NG/ML — SIGNIFICANT CHANGE UP (ref 0.01–0.04)
WBC # BLD: 17.02 K/UL — HIGH (ref 3.8–10.5)
WBC # FLD AUTO: 17.02 K/UL — HIGH (ref 3.8–10.5)

## 2021-05-04 PROCEDURE — 99232 SBSQ HOSP IP/OBS MODERATE 35: CPT

## 2021-05-04 PROCEDURE — 78226 HEPATOBILIARY SYSTEM IMAGING: CPT | Mod: 26

## 2021-05-04 PROCEDURE — 93306 TTE W/DOPPLER COMPLETE: CPT | Mod: 26

## 2021-05-04 RX ORDER — MORPHINE SULFATE 50 MG/1
4 CAPSULE, EXTENDED RELEASE ORAL ONCE
Refills: 0 | Status: DISCONTINUED | OUTPATIENT
Start: 2021-05-04 | End: 2021-05-04

## 2021-05-04 RX ADMIN — Medication 2: at 12:22

## 2021-05-04 RX ADMIN — HEPARIN SODIUM 5000 UNIT(S): 5000 INJECTION INTRAVENOUS; SUBCUTANEOUS at 05:15

## 2021-05-04 RX ADMIN — MORPHINE SULFATE 2 MILLIGRAM(S): 50 CAPSULE, EXTENDED RELEASE ORAL at 23:45

## 2021-05-04 RX ADMIN — Medication 3: at 06:29

## 2021-05-04 RX ADMIN — Medication 81 MILLIGRAM(S): at 12:23

## 2021-05-04 RX ADMIN — PIPERACILLIN AND TAZOBACTAM 25 GRAM(S): 4; .5 INJECTION, POWDER, LYOPHILIZED, FOR SOLUTION INTRAVENOUS at 21:11

## 2021-05-04 RX ADMIN — Medication 650 MILLIGRAM(S): at 05:15

## 2021-05-04 RX ADMIN — ATORVASTATIN CALCIUM 80 MILLIGRAM(S): 80 TABLET, FILM COATED ORAL at 21:11

## 2021-05-04 RX ADMIN — Medication 1: at 17:25

## 2021-05-04 RX ADMIN — PIPERACILLIN AND TAZOBACTAM 25 GRAM(S): 4; .5 INJECTION, POWDER, LYOPHILIZED, FOR SOLUTION INTRAVENOUS at 16:52

## 2021-05-04 RX ADMIN — Medication 50 MILLIGRAM(S): at 05:15

## 2021-05-04 RX ADMIN — MORPHINE SULFATE 4 MILLIGRAM(S): 50 CAPSULE, EXTENDED RELEASE ORAL at 15:19

## 2021-05-04 RX ADMIN — HEPARIN SODIUM 5000 UNIT(S): 5000 INJECTION INTRAVENOUS; SUBCUTANEOUS at 17:25

## 2021-05-04 RX ADMIN — SODIUM CHLORIDE 60 MILLILITER(S): 9 INJECTION INTRAMUSCULAR; INTRAVENOUS; SUBCUTANEOUS at 21:11

## 2021-05-04 RX ADMIN — PIPERACILLIN AND TAZOBACTAM 25 GRAM(S): 4; .5 INJECTION, POWDER, LYOPHILIZED, FOR SOLUTION INTRAVENOUS at 05:15

## 2021-05-04 NOTE — H&P ADULT - PROBLEM SELECTOR PLAN 1
Pt with cholecystitis per imaging and clinical symptoms  sx eval noted  labs noted  ivf  npo   iv abx  trend labs  pain control  pt is at moderate risk due to underlying comorbidities including extensive cardiac history, but is medically optimized for proposed cholecystecectomy   cardiology eval noted - imaging and reports from cardiology office noted and reviewed

## 2021-05-04 NOTE — H&P ADULT - PROBLEM SELECTOR PLAN 2
cardiology eval noted  cardiac enzymes are negative for 3 sets  echo from 2020 noted  medications and cardiac management per cardiology recs  pt medially opitimzied for emergent cholecystectomy

## 2021-05-04 NOTE — PROGRESS NOTE ADULT - SUBJECTIVE AND OBJECTIVE BOX
[facility] -- Hugo Dee, Armand Solomon, Lobito Funes Savella, [place]ger  Office # [phone]    Follow Up:  Preop Eval, Hx of CAD s/p CABG, CHB s/p PPM    Subjective/Observations: Awake and alert, comfortable on NC.  Denies any SOB QUIROS or orthopnea.  Denies chest pain or palpitation.  No tele events overnight.  Denies nausea, vomiting or abdominal pain    REVIEW OF SYSTEMS: All other review of systems is negative unless indicated above  PAST MEDICAL & SURGICAL HISTORY:  HTN (hypertension)    Diabetes    CAD (coronary artery disease)    Cardiac pacemaker    S/P CABG (coronary artery bypass graft)    MEDICATIONS  (STANDING):  aspirin enteric coated 81 milliGRAM(s) Oral daily  atorvastatin 80 milliGRAM(s) Oral at bedtime  dextrose 40% Gel 15 Gram(s) Oral once  dextrose 5%. 1000 milliLiter(s) (50 mL/Hr) IV Continuous <Continuous>  dextrose 5%. 1000 milliLiter(s) (100 mL/Hr) IV Continuous <Continuous>  dextrose 50% Injectable 25 Gram(s) IV Push once  dextrose 50% Injectable 12.5 Gram(s) IV Push once  dextrose 50% Injectable 25 Gram(s) IV Push once  glucagon  Injectable 1 milliGRAM(s) IntraMuscular once  heparin   Injectable 5000 Unit(s) SubCutaneous every 12 hours  insulin lispro (ADMELOG) corrective regimen sliding scale   SubCutaneous three times a day before meals  metoprolol succinate ER 50 milliGRAM(s) Oral daily  piperacillin/tazobactam IVPB.. 3.375 Gram(s) IV Intermittent every 8 hours  sodium chloride 0.9%. 1000 milliLiter(s) (60 mL/Hr) IV Continuous <Continuous>    MEDICATIONS  (PRN):  acetaminophen   Tablet .. 650 milliGRAM(s) Oral every 6 hours PRN Temp greater or equal to 38C (100.4F)  morphine  - Injectable 2 milliGRAM(s) IV Push every 6 hours PRN Severe Pain (7 - 10)  oxycodone    5 mG/acetaminophen 325 mG 1 Tablet(s) Oral every 4 hours PRN Mild Pain (1 - 3)  oxycodone    5 mG/acetaminophen 325 mG 2 Tablet(s) Oral every 6 hours PRN Moderate Pain (4 - 6)    Allergies    No Known Allergies    Intolerances    Vital Signs Last 24 Hrs  T(C): 36.4 (04 May 2021 09:45), Max: 38.2 (04 May 2021 05:40)  T(F): 97.5 (04 May 2021 09:45), Max: 100.7 (04 May 2021 05:40)  HR: 79 (04 May 2021 09:45) (74 - 99)  BP: 132/70 (04 May 2021 09:45) (99/63 - 144/76)  BP(mean): --  RR: 17 (04 May 2021 09:45) (16 - 26)  SpO2: 92% (04 May 2021 09:45) (92% - 99%)  I&O's Summary    03 May 2021 07:01  -  04 May 2021 07:00  --------------------------------------------------------  IN: 800 mL / OUT: 950 mL / NET: -150 mL    04 May 2021 07:01  -  04 May 2021 09:51  --------------------------------------------------------  IN: 0 mL / OUT: 100 mL / NET: -100 mL      Weight (kg): 102.1 (05-03 @ 12:11)    PHYSICAL EXAM:  TELE: NSR  Constitutional: NAD, awake and alert, obese  HEENT: Moist Mucous Membranes, Anicteric  Pulmonary: Non-labored, breath sounds are clear bilaterally, No wheezing, rales or rhonchi  Cardiovascular: Regular, S1 and S2, No murmurs, rubs, gallops or clicks  Gastrointestinal: Bowel Sounds present, softly distended, nontender.   Lymph: No peripheral edema. No lymphadenopathy.  Skin: No visible rashes or ulcers.  Psych:  Mood & affect appropriate  LABS: All Labs Reviewed:                        10.8   17.02 )-----------( 205      ( 04 May 2021 05:14 )             31.6                         12.0   19.63 )-----------( 228      ( 03 May 2021 12:50 )             35.5     04 May 2021 05:14    141    |  104    |  30     ----------------------------<  240    3.8     |  26     |  1.90   03 May 2021 14:06    134    |  96     |  35     ----------------------------<  366    3.4     |  24     |  2.50     Ca    7.7        04 May 2021 05:14  Ca    8.3        03 May 2021 14:06    TPro  6.7    /  Alb  2.5    /  TBili  1.1    /  DBili  .40    /  AST  26     /  ALT  19     /  AlkPhos  128    04 May 2021 05:14  TPro  8.3    /  Alb  2.7    /  TBili  1.2    /  DBili  x      /  AST  30     /  ALT  23     /  AlkPhos  130    03 May 2021 14:06    PT/INR - ( 03 May 2021 14:48 )   PT: 13.3 sec;   INR: 1.14 ratio       PTT - ( 03 May 2021 14:48 )  PTT:29.6 sec  CARDIAC MARKERS ( 04 May 2021 05:14 )  <.015 ng/mL / x     / 65 U/L / x     / x      CARDIAC MARKERS ( 03 May 2021 20:47 )  <.015 ng/mL / x     / 51 U/L / x     / x      CARDIAC MARKERS ( 03 May 2021 19:08 )  <.015 ng/mL / x     / 58 U/L / x     / <1.0 ng/mL  CARDIAC MARKERS ( 03 May 2021 14:06 )  <.015 ng/mL / x     / 70 U/L / x     / <1.0 ng/mL      EXAM:  CT ABDOMEN AND PELVIS                            PROCEDURE DATE:  05/03/2021          INTERPRETATION:  CLINICAL INFORMATION: abd pain    COMPARISON: None.    CONTRAST/COMPLICATIONS:  IV Contrast: NONE  0 cc administered   0 cc discarded  Oral Contrast: NONE  Complications: None reported at time of study completion    PROCEDURE:  CT of the Abdomen and Pelvis was performed.  Sagittal and coronal reformats were performed.    FINDINGS:    LOWER CHEST: Status post sternotomy.  Pacemaker leads.    LIVER: Within normal limits.  BILE DUCTS: Normal caliber.  GALLBLADDER: Cholelithiasis. Mild inflammatory changes.  SPLEEN: Within normal limits.  PANCREAS: Within normal limits.  ADRENALS: Within normal limits.  KIDNEYS/URETERS: Indeterminate 2 cm exophytic right midpole lesion.    BLADDER: Within normal limits.  REPRODUCTIVE ORGANS: Within normal limits.    BOWEL: No bowel obstruction. The appendix is normal.  PERITONEUM: No ascites.  VESSELS:  Within normal limits.  RETROPERITONEUM/LYMPH NODES: No lymphadenopathy.  ABDOMINAL WALL: Small inguinal hernias.  BONES: Advanced degenerative changes of the left hip.    IMPRESSION: Suspicious for acute cholecystitis.    Indeterminate 2 cm right renal lesion.    The findings of acute cholecystitis were discussed with Dr. BRIGIDO MAK on 5/3/2021 5:36 PM.    BRANDY FLEMING MD; Attending Radiologist  This document has been electronically signed. May  3 2021  5:40PM       EXAM:  XR CHEST PORTABLE URGENT 1V                            PROCEDURE DATE:  05/03/2021          INTERPRETATION:  AP chest on May 3, 2021 at 12:43 PM. Patient has chest pain.    COMPARISON: None available.    Heart size is within normal limits. Sternotomy and left-sided pacemaker are noted.    There are few slight linear densities in the right mid lower lung field consistent with slight scar or atelectasis.    IMPRESSION: Slight linear densities right chest. Sternotomy and pacemaker.    WILIAN BUSH M.D., ATTENDING RADIOLOGIST  This document has been electronically signed. May  3 2021  1:39PM

## 2021-05-04 NOTE — H&P ADULT - HISTORY OF PRESENT ILLNESS
79yo M ho DM, HTN, CAD, CABG, PPM, presents with 3 days of right sided pain, had nausea and vomiting 2 days ago, but that resolved. went to pcp  for this right sided pain who noted ekg changes with st depressions in lateral leads so sent to ED. pt states he has been having erratic breathing but not new. denies fevers, chills, cough. no abd surgeries, no urinary sx. pt seen by cardiology and general sx in er.

## 2021-05-04 NOTE — CONSULT NOTE ADULT - SUBJECTIVE AND OBJECTIVE BOX
Patient is a 80y old  Male who presents with a chief complaint of right sided abd pain since friday with ekg changes in pcp office (04 May 2021 12:21)    HPI:  79yo M ho DM, HTN, CAD, CABG, PPM, presents with 3 days of right sided pain, had nausea and vomiting 2 days ago, but that resolved. went to pcp  for this right sided pain who noted ekg changes with st depressions in lateral leads so sent to ED. pt states he has been having erratic breathing but not new. denies fevers, chills, cough. no abd surgeries, no urinary sx. pt seen by cardiology and general sx in er.  (04 May 2021 09:25)    Renal consult called for ANITA. History obtained from chart and patient.       PAST MEDICAL HISTORY:  HTN (hypertension)    Diabetes    CAD (coronary artery disease)    Cardiac pacemaker        PAST SURGICAL HISTORY:  No significant past surgical history    S/P CABG (coronary artery bypass graft)        FAMILY HISTORY:  No pertinent family history in first degree relatives        SOCIAL HISTORY: No smoking or alcohol use     Allergies    No Known Allergies    Intolerances      Home Medications:  amLODIPine 5 mg oral tablet: 1 tab(s) orally once a day (03 May 2021 17:25)  aspirin 81 mg oral tablet: orally once a day (03 May 2021 17:25)  atorvastatin 80 mg oral tablet: 1 tab(s) orally once a day (03 May 2021 17:25)  hydroCHLOROthiazide 25 mg oral tablet: 1 tab(s) orally once a day (03 May 2021 17:25)  losartan 100 mg oral tablet: 1 tab(s) orally once a day (03 May 2021 17:25)  metFORMIN 1000 mg oral tablet: 1 tab(s) orally 2 times a day (03 May 2021 17:25)  Metoprolol Succinate ER 50 mg oral tablet, extended release: 1 tab(s) orally once a day (03 May 2021 17:25)    MEDICATIONS  (STANDING):  aspirin enteric coated 81 milliGRAM(s) Oral daily  atorvastatin 80 milliGRAM(s) Oral at bedtime  dextrose 40% Gel 15 Gram(s) Oral once  dextrose 5%. 1000 milliLiter(s) (50 mL/Hr) IV Continuous <Continuous>  dextrose 5%. 1000 milliLiter(s) (100 mL/Hr) IV Continuous <Continuous>  dextrose 50% Injectable 25 Gram(s) IV Push once  dextrose 50% Injectable 12.5 Gram(s) IV Push once  dextrose 50% Injectable 25 Gram(s) IV Push once  glucagon  Injectable 1 milliGRAM(s) IntraMuscular once  heparin   Injectable 5000 Unit(s) SubCutaneous every 12 hours  insulin lispro (ADMELOG) corrective regimen sliding scale   SubCutaneous three times a day before meals  metoprolol succinate ER 50 milliGRAM(s) Oral daily  piperacillin/tazobactam IVPB.. 3.375 Gram(s) IV Intermittent every 8 hours  sodium chloride 0.9%. 1000 milliLiter(s) (60 mL/Hr) IV Continuous <Continuous>    MEDICATIONS  (PRN):  acetaminophen   Tablet .. 650 milliGRAM(s) Oral every 6 hours PRN Temp greater or equal to 38C (100.4F)  morphine  - Injectable 2 milliGRAM(s) IV Push every 6 hours PRN Severe Pain (7 - 10)  oxycodone    5 mG/acetaminophen 325 mG 1 Tablet(s) Oral every 4 hours PRN Mild Pain (1 - 3)  oxycodone    5 mG/acetaminophen 325 mG 2 Tablet(s) Oral every 6 hours PRN Moderate Pain (4 - 6)      REVIEW OF SYSTEMS:  General: no distress  Respiratory: No cough, SOB  Cardiovascular: No CP or Palpitations	  Gastrointestinal: No nausea, Vomiting. No diarrhea  Genitourinary: No urinary complaints	  Musculoskeletal:  No new rash or lesions		  all other systems negative    T(F): 97.5 (21 @ 09:45), Max: 100.7 (21 @ 05:40)  HR: 83 (21 @ 15:15) (74 - 97)  BP: 128/63 (21 @ 15:15) (113/60 - 144/76)  RR: 16 (21 @ 15:15) (16 - 18)  SpO2: 97% (21 @ 15:15) (92% - 99%)  Wt(kg): --    PHYSICAL EXAM:  General: NAD  Respiratory: b/l air entry  Cardiovascular: S1 S2  Gastrointestinal: soft  Extremities: + edema            141  |  104  |  30<H>  ----------------------------<  240<H>  3.8   |  26  |  1.90<H>    Ca    7.7<L>      04 May 2021 05:14    TPro  6.7  /  Alb  2.5<L>  /  TBili  1.1  /  DBili  .40<H>  /  AST  26  /  ALT  19  /  AlkPhos  128<H>                            10.8   17.02 )-----------( 205      ( 04 May 2021 05:14 )             31.6       Hemoglobin: 10.8 g/dL ( @ 05:14)  Hematocrit: 31.6 % ( @ 05:14)  Potassium, Serum: 3.8 mmol/L ( @ 05:14)  Blood Urea Nitrogen, Serum: 30 mg/dL ( @ 05:14)      Creatinine, Serum: 1.90 ( @ 05:14)  Creatinine, Serum: 2.50 ( @ 14:06)      Urinalysis Basic - ( 03 May 2021 17:14 )    Color: Yellow / Appearance: Slightly Turbid / S.010 / pH: x  Gluc: x / Ketone: Trace  / Bili: Negative / Urobili: Negative   Blood: x / Protein: 30 mg/dL / Nitrite: Negative   Leuk Esterase: Moderate / RBC: 0-2 /HPF / WBC >50   Sq Epi: x / Non Sq Epi: Occasional / Bacteria: Many      LIVER FUNCTIONS - ( 04 May 2021 05:14 )  Alb: 2.5 g/dL / Pro: 6.7 g/dL / ALK PHOS: 128 U/L / ALT: 19 U/L / AST: 26 U/L / GGT: x           CARDIAC MARKERS ( 04 May 2021 12:07 )  <.015 ng/mL / x     / 44 U/L / x     / x      CARDIAC MARKERS ( 04 May 2021 05:14 )  <.015 ng/mL / x     / 65 U/L / x     / x      CARDIAC MARKERS ( 03 May 2021 20:47 )  <.015 ng/mL / x     / 51 U/L / x     / x      CARDIAC MARKERS ( 03 May 2021 19:08 )  <.015 ng/mL / x     / 58 U/L / x     / <1.0 ng/mL  CARDIAC MARKERS ( 03 May 2021 14:06 )  <.015 ng/mL / x     / 70 U/L / x     / <1.0 ng/mL      Creatine Kinase, Serum: 44 U/L (21 @ 12:07)  Creatine Kinase, Serum: 65 U/L (21 @ 05:14)  Creatine Kinase, Serum: 51 U/L (21 @ 20:47)  Creatine Kinase, Serum: 58 U/L (21 @ 19:08)  Creatine Kinase, Serum: 70 U/L (21 @ 14:06)          I&O's Detail    03 May 2021 07:01  -  04 May 2021 07:00  --------------------------------------------------------  IN:    IV PiggyBack: 200 mL    sodium chloride 0.9%: 600 mL  Total IN: 800 mL    OUT:    Voided (mL): 950 mL  Total OUT: 950 mL    Total NET: -150 mL      04 May 2021 07:  -  04 May 2021 16:11  --------------------------------------------------------  IN:  Total IN: 0 mL    OUT:    Voided (mL): 100 mL  Total OUT: 100 mL    Total NET: -100 mL        < from: CT Abdomen and Pelvis No Cont (21 @ 17:24) >    EXAM:  CT ABDOMEN AND PELVIS                            PROCEDURE DATE:  2021          INTERPRETATION:  CLINICAL INFORMATION: abd pain    COMPARISON: None.    CONTRAST/COMPLICATIONS:  IV Contrast: NONE  0 cc administered   0 cc discarded  Oral Contrast: NONE  Complications: None reported at time of study completion    PROCEDURE:  CT of the Abdomen and Pelvis was performed.  Sagittal and coronal reformats were performed.    FINDINGS:    LOWER CHEST: Status post sternotomy.  Pacemaker leads.    LIVER: Within normal limits.  BILE DUCTS: Normal caliber.  GALLBLADDER: Cholelithiasis. Mild inflammatory changes.  SPLEEN: Within normal limits.  PANCREAS: Within normal limits.  ADRENALS: Within normal limits.  KIDNEYS/URETERS: Indeterminate 2 cm exophytic right midpole lesion.    BLADDER: Within normal limits.  REPRODUCTIVE ORGANS: Within normal limits.    BOWEL: No bowel obstruction. The appendix is normal.  PERITONEUM: No ascites.  VESSELS:  Within normal limits.  RETROPERITONEUM/LYMPH NODES: No lymphadenopathy.  ABDOMINAL WALL: Small inguinal hernias.  BONES: Advanced degenerative changes of the left hip.    IMPRESSION: Suspicious for acute cholecystitis.    Indeterminate 2 cm right renal lesion.    The findings of acute cholecystitis were discussed with Dr. BRIGIDO MAK on 5/3/2021 5:36 PM.        BRANDY FLEMING MD; Attending Radiologist  This document has been electronically signed. May  3 2021  5:40PM    < end of copied text >      `< from: US Abdomen Limited (21 @ 15:39) >    EXAM:  US ABDOMEN LIMITED                            PROCEDURE DATE:  2021          INTERPRETATION:  CLINICAL INFORMATION: Right upper quadrant abdominal pain.    COMPARISON: None available.    TECHNIQUE: Right upper quadrant sonography.    FINDINGS:    Evaluation is limited due to overlying bowel gas and patient's body habitus.    There is limited visualization of the liver.    There is a thickened gallbladder wall measuring 7 mm. There is dependent echogenic biliary sludge with probablesmall shadowing gallstones  There is a 3 mm, nonmobile, nonshadowing echogenic focus adherent to the gallbladder wall which could reflect gallbladder polyp.    The visualized common bile duct is normal in caliber measuring 5 mm.    The pancreas is not adequately visualized.    The right kidney measures 11.8 cm in length. No hydronephrosis.  Small renal cyst.    The abdominal aorta and IVC are not adequately visualized.    No abdominal ascites is noted.    IMPRESSION:    Limited study.    Gallbladder wall thickening with dependent biliary sludge and probable small dependent gallstones.    Possible small gallbladder polyp.    If there is a clinical suspicion for acute cholecystitis, recommend further evaluation with nuclear medicine HIDA scan.              HEBERT CONTRERAS M.D., ATTENDING RADIOLOGIST  This document has been electronically signed. May  3 2021  3:48PM    < end of copied text >

## 2021-05-04 NOTE — H&P ADULT - PROBLEM SELECTOR PLAN 6
new onset per old labs  ivf  hold nephrotoxins including arb and diuretics  renal eval  ct noted  reanl eval for renal leison  ivf  trend labs

## 2021-05-04 NOTE — H&P ADULT - PROBLEM SELECTOR PLAN 5
bp on low side  continue metoprolol as tolerated  hold losartan and norvasc due to low bp and isabelle

## 2021-05-04 NOTE — CONSULT NOTE ADULT - ASSESSMENT
ANITA: Prerenal azotemia, on ARB  Acute cholecystitis  ? Rt renal lesion  Diabetes  Hypertension    Improving renal indices. Hold ARB. Surgery evaluation in progress. Monitor blood sugar levels. Insulin coverage as needed.   Monitor BP trend. Titrate BP meds as needed. Salt restriction. Avoid nephrotoxic meds as possible. Avoid ACEI, ARB, NSAIDs and IV contrast. Will follow electrolytes and renal function trend. D/w patient regarding need for out patient nephrology follow up.    evaluation when stable for Rt renal lesion. Further recommendations pending clinical course. Thank you for the courtesy of this referral.

## 2021-05-04 NOTE — PROGRESS NOTE ADULT - SUBJECTIVE AND OBJECTIVE BOX
pt seen  states feeling slightly better  ICU Vital Signs Last 24 Hrs  T(C): 36.4 (04 May 2021 09:45), Max: 38.2 (04 May 2021 05:40)  T(F): 97.5 (04 May 2021 09:45), Max: 100.7 (04 May 2021 05:40)  HR: 79 (04 May 2021 09:45) (74 - 97)  BP: 132/70 (04 May 2021 09:45) (108/57 - 144/76)  BP(mean): --  ABP: --  ABP(mean): --  RR: 17 (04 May 2021 09:45) (16 - 18)  SpO2: 92% (04 May 2021 09:45) (92% - 99%)  gen-NAD  resp-clear  abd-soft ND, RUQ tenderness                        10.8   17.02 )-----------( 205      ( 04 May 2021 05:14 )             31.6   05-04    141  |  104  |  30<H>  ----------------------------<  240<H>  3.8   |  26  |  1.90<H>    Ca    7.7<L>      04 May 2021 05:14    TPro  6.7  /  Alb  2.5<L>  /  TBili  1.1  /  DBili  .40<H>  /  AST  26  /  ALT  19  /  AlkPhos  128<H>  05-04

## 2021-05-04 NOTE — H&P ADULT - NSICDXPASTMEDICALHX_GEN_ALL_CORE_FT
PAST MEDICAL HISTORY:  CAD (coronary artery disease)     Cardiac pacemaker     Diabetes     HTN (hypertension)

## 2021-05-04 NOTE — CONSULT NOTE ADULT - SUBJECTIVE AND OBJECTIVE BOX
Patient is a 80y old  Male who presents with a chief complaint of right sided abd pain since friday with ekg changes in pcp office (04 May 2021 09:25)    HPI:    81yo M ho DM, HTN, CAD, CABG, PPM, presents with 3 days of right sided pain, had nausea and vomiting 2 days ago, but that resolved. went to pcp  for this right sided pain who noted ekg changes with st depressions in lateral leads so sent to ED. pt states he has been having erratic breathing but not new. denies fevers, chills, cough. no abd surgeries, no urinary sx. pt seen by cardiology and general sx in er.   (04 May 2021 09:25)      PAST MEDICAL HISTORY:  HTN (hypertension)    Diabetes    CAD (coronary artery disease)    Cardiac pacemaker        PAST SURGICAL HISTORY:  No significant past surgical history    S/P CABG (coronary artery bypass graft)        FAMILY HISTORY:  No pertinent family history in first degree relatives        SOCIAL HISTORY:    Allergies    No Known Allergies    Intolerances      Home Medications:  amLODIPine 5 mg oral tablet: 1 tab(s) orally once a day (03 May 2021 17:25)  aspirin 81 mg oral tablet: orally once a day (03 May 2021 17:25)  atorvastatin 80 mg oral tablet: 1 tab(s) orally once a day (03 May 2021 17:25)  hydroCHLOROthiazide 25 mg oral tablet: 1 tab(s) orally once a day (03 May 2021 17:25)  losartan 100 mg oral tablet: 1 tab(s) orally once a day (03 May 2021 17:25)  metFORMIN 1000 mg oral tablet: 1 tab(s) orally 2 times a day (03 May 2021 17:25)  Metoprolol Succinate ER 50 mg oral tablet, extended release: 1 tab(s) orally once a day (03 May 2021 17:25)    MEDICATIONS  (STANDING):  aspirin enteric coated 81 milliGRAM(s) Oral daily  atorvastatin 80 milliGRAM(s) Oral at bedtime  dextrose 40% Gel 15 Gram(s) Oral once  dextrose 5%. 1000 milliLiter(s) (50 mL/Hr) IV Continuous <Continuous>  dextrose 5%. 1000 milliLiter(s) (100 mL/Hr) IV Continuous <Continuous>  dextrose 50% Injectable 25 Gram(s) IV Push once  dextrose 50% Injectable 12.5 Gram(s) IV Push once  dextrose 50% Injectable 25 Gram(s) IV Push once  glucagon  Injectable 1 milliGRAM(s) IntraMuscular once  heparin   Injectable 5000 Unit(s) SubCutaneous every 12 hours  insulin lispro (ADMELOG) corrective regimen sliding scale   SubCutaneous three times a day before meals  metoprolol succinate ER 50 milliGRAM(s) Oral daily  piperacillin/tazobactam IVPB.. 3.375 Gram(s) IV Intermittent every 8 hours  sodium chloride 0.9%. 1000 milliLiter(s) (60 mL/Hr) IV Continuous <Continuous>    MEDICATIONS  (PRN):  acetaminophen   Tablet .. 650 milliGRAM(s) Oral every 6 hours PRN Temp greater or equal to 38C (100.4F)  morphine  - Injectable 2 milliGRAM(s) IV Push every 6 hours PRN Severe Pain (7 - 10)  oxycodone    5 mG/acetaminophen 325 mG 1 Tablet(s) Oral every 4 hours PRN Mild Pain (1 - 3)  oxycodone    5 mG/acetaminophen 325 mG 2 Tablet(s) Oral every 6 hours PRN Moderate Pain (4 - 6)      REVIEW OF SYSTEMS:  General:   Respiratory: No cough, SOB  Cardiovascular: No CP or Palpitations	  Gastrointestinal: No nausea, Vomiting. No diarrhea  Genitourinary: No urinary complaints	  Musculoskeletal: No leg swelling, No new rash or lesions	  Neurological: 	  all other systems negative    T(F): 97.5 (21 @ 09:45), Max: 100.7 (21 @ 05:40)  HR: 79 (21 @ 09:45) (74 - 99)  BP: 132/70 (21 @ 09:45) (99/63 - 144/76)  RR: 17 (21 @ 09:45) (16 - 26)  SpO2: 92% (21 @ 09:45) (92% - 99%)  Wt(kg): --    PHYSICAL EXAM:  General: NAD  Respiratory: b/l air entry  Cardiovascular: S1 S2  Gastrointestinal: soft  Extremities: edema            141  |  104  |  30<H>  ----------------------------<  240<H>  3.8   |  26  |  1.90<H>    Ca    7.7<L>      04 May 2021 05:14    TPro  6.7  /  Alb  2.5<L>  /  TBili  1.1  /  DBili  .40<H>  /  AST  26  /  ALT  19  /  AlkPhos  128<H>                            10.8   17.02 )-----------( 205      ( 04 May 2021 05:14 )             31.6       Hemoglobin: 10.8 g/dL ( @ 05:14)  Hematocrit: 31.6 % ( 05:14)  Potassium, Serum: 3.8 mmol/L ( @ 05:14)  Blood Urea Nitrogen, Serum: 30 mg/dL ( 05:14)      Creatinine, Serum: 1.90 ( 05:14)  Creatinine, Serum: 2.50 ( @ 14:06)      Urinalysis Basic - ( 03 May 2021 17:14 )    Color: Yellow / Appearance: Slightly Turbid / S.010 / pH: x  Gluc: x / Ketone: Trace  / Bili: Negative / Urobili: Negative   Blood: x / Protein: 30 mg/dL / Nitrite: Negative   Leuk Esterase: Moderate / RBC: 0-2 /HPF / WBC >50   Sq Epi: x / Non Sq Epi: Occasional / Bacteria: Many      LIVER FUNCTIONS - ( 04 May 2021 05:14 )  Alb: 2.5 g/dL / Pro: 6.7 g/dL / ALK PHOS: 128 U/L / ALT: 19 U/L / AST: 26 U/L / GGT: x           CARDIAC MARKERS ( 04 May 2021 05:14 )  <.015 ng/mL / x     / 65 U/L / x     / x      CARDIAC MARKERS ( 03 May 2021 20:47 )  <.015 ng/mL / x     / 51 U/L / x     / x      CARDIAC MARKERS ( 03 May 2021 19:08 )  <.015 ng/mL / x     / 58 U/L / x     / <1.0 ng/mL  CARDIAC MARKERS ( 03 May 2021 14:06 )  <.015 ng/mL / x     / 70 U/L / x     / <1.0 ng/mL      Creatine Kinase, Serum: 65 U/L (21 @ 05:14)  Creatine Kinase, Serum: 51 U/L (21 @ 20:47)  Creatine Kinase, Serum: 58 U/L (21 @ 19:08)  Creatine Kinase, Serum: 70 U/L (21 @ 14:06)          I&O's Detail    03 May 2021 07:01  -  04 May 2021 07:00  --------------------------------------------------------  IN:    IV PiggyBack: 200 mL    sodium chloride 0.9%: 600 mL  Total IN: 800 mL    OUT:    Voided (mL): 950 mL  Total OUT: 950 mL    Total NET: -150 mL      04 May 2021 07:01  -  04 May 2021 09:46  --------------------------------------------------------  IN:  Total IN: 0 mL    OUT:    Voided (mL): 100 mL  Total OUT: 100 mL    Total NET: -100 mL

## 2021-05-05 LAB
-  AMIKACIN: SIGNIFICANT CHANGE UP
-  AMOXICILLIN/CLAVULANIC ACID: SIGNIFICANT CHANGE UP
-  AMPICILLIN/SULBACTAM: SIGNIFICANT CHANGE UP
-  AMPICILLIN: SIGNIFICANT CHANGE UP
-  AZTREONAM: SIGNIFICANT CHANGE UP
-  CEFAZOLIN: SIGNIFICANT CHANGE UP
-  CEFEPIME: SIGNIFICANT CHANGE UP
-  CEFOXITIN: SIGNIFICANT CHANGE UP
-  CEFTRIAXONE: SIGNIFICANT CHANGE UP
-  CIPROFLOXACIN: SIGNIFICANT CHANGE UP
-  ERTAPENEM: SIGNIFICANT CHANGE UP
-  GENTAMICIN: SIGNIFICANT CHANGE UP
-  IMIPENEM: SIGNIFICANT CHANGE UP
-  LEVOFLOXACIN: SIGNIFICANT CHANGE UP
-  MEROPENEM: SIGNIFICANT CHANGE UP
-  NITROFURANTOIN: SIGNIFICANT CHANGE UP
-  PIPERACILLIN/TAZOBACTAM: SIGNIFICANT CHANGE UP
-  TIGECYCLINE: SIGNIFICANT CHANGE UP
-  TOBRAMYCIN: SIGNIFICANT CHANGE UP
-  TRIMETHOPRIM/SULFAMETHOXAZOLE: SIGNIFICANT CHANGE UP
ALBUMIN SERPL ELPH-MCNC: 2.2 G/DL — LOW (ref 3.3–5)
ALP SERPL-CCNC: 142 U/L — HIGH (ref 40–120)
ALT FLD-CCNC: 29 U/L — SIGNIFICANT CHANGE UP (ref 12–78)
ANION GAP SERPL CALC-SCNC: 10 MMOL/L — SIGNIFICANT CHANGE UP (ref 5–17)
AST SERPL-CCNC: 40 U/L — HIGH (ref 15–37)
BILIRUB SERPL-MCNC: 0.8 MG/DL — SIGNIFICANT CHANGE UP (ref 0.2–1.2)
BUN SERPL-MCNC: 26 MG/DL — HIGH (ref 7–23)
CALCIUM SERPL-MCNC: 8.5 MG/DL — SIGNIFICANT CHANGE UP (ref 8.5–10.1)
CHLORIDE SERPL-SCNC: 108 MMOL/L — SIGNIFICANT CHANGE UP (ref 96–108)
CO2 SERPL-SCNC: 26 MMOL/L — SIGNIFICANT CHANGE UP (ref 22–31)
CREAT SERPL-MCNC: 1.5 MG/DL — HIGH (ref 0.5–1.3)
CULTURE RESULTS: SIGNIFICANT CHANGE UP
GLUCOSE SERPL-MCNC: 153 MG/DL — HIGH (ref 70–99)
GRAM STN FLD: SIGNIFICANT CHANGE UP
HCT VFR BLD CALC: 30.9 % — LOW (ref 39–50)
HGB BLD-MCNC: 10.3 G/DL — LOW (ref 13–17)
MCHC RBC-ENTMCNC: 29.8 PG — SIGNIFICANT CHANGE UP (ref 27–34)
MCHC RBC-ENTMCNC: 33.3 GM/DL — SIGNIFICANT CHANGE UP (ref 32–36)
MCV RBC AUTO: 89.3 FL — SIGNIFICANT CHANGE UP (ref 80–100)
METHOD TYPE: SIGNIFICANT CHANGE UP
NRBC # BLD: 0 /100 WBCS — SIGNIFICANT CHANGE UP (ref 0–0)
ORGANISM # SPEC MICROSCOPIC CNT: SIGNIFICANT CHANGE UP
ORGANISM # SPEC MICROSCOPIC CNT: SIGNIFICANT CHANGE UP
PLATELET # BLD AUTO: 219 K/UL — SIGNIFICANT CHANGE UP (ref 150–400)
POTASSIUM SERPL-MCNC: 3.4 MMOL/L — LOW (ref 3.5–5.3)
POTASSIUM SERPL-SCNC: 3.4 MMOL/L — LOW (ref 3.5–5.3)
PROT SERPL-MCNC: 7.6 G/DL — SIGNIFICANT CHANGE UP (ref 6–8.3)
RBC # BLD: 3.46 M/UL — LOW (ref 4.2–5.8)
RBC # FLD: 15 % — HIGH (ref 10.3–14.5)
SODIUM SERPL-SCNC: 144 MMOL/L — SIGNIFICANT CHANGE UP (ref 135–145)
SPECIMEN SOURCE: SIGNIFICANT CHANGE UP
SPECIMEN SOURCE: SIGNIFICANT CHANGE UP
WBC # BLD: 14.56 K/UL — HIGH (ref 3.8–10.5)
WBC # FLD AUTO: 14.56 K/UL — HIGH (ref 3.8–10.5)

## 2021-05-05 PROCEDURE — 99232 SBSQ HOSP IP/OBS MODERATE 35: CPT

## 2021-05-05 PROCEDURE — 47490 INCISION OF GALLBLADDER: CPT

## 2021-05-05 RX ORDER — POTASSIUM CHLORIDE 20 MEQ
20 PACKET (EA) ORAL
Refills: 0 | Status: COMPLETED | OUTPATIENT
Start: 2021-05-05 | End: 2021-05-05

## 2021-05-05 RX ORDER — HEPARIN SODIUM 5000 [USP'U]/ML
5000 INJECTION INTRAVENOUS; SUBCUTANEOUS EVERY 12 HOURS
Refills: 0 | Status: DISCONTINUED | OUTPATIENT
Start: 2021-05-05 | End: 2021-05-07

## 2021-05-05 RX ADMIN — Medication 50 MILLIGRAM(S): at 05:06

## 2021-05-05 RX ADMIN — Medication 650 MILLIGRAM(S): at 05:07

## 2021-05-05 RX ADMIN — PIPERACILLIN AND TAZOBACTAM 25 GRAM(S): 4; .5 INJECTION, POWDER, LYOPHILIZED, FOR SOLUTION INTRAVENOUS at 14:32

## 2021-05-05 RX ADMIN — MORPHINE SULFATE 2 MILLIGRAM(S): 50 CAPSULE, EXTENDED RELEASE ORAL at 00:50

## 2021-05-05 RX ADMIN — PIPERACILLIN AND TAZOBACTAM 25 GRAM(S): 4; .5 INJECTION, POWDER, LYOPHILIZED, FOR SOLUTION INTRAVENOUS at 05:06

## 2021-05-05 RX ADMIN — Medication 81 MILLIGRAM(S): at 12:41

## 2021-05-05 RX ADMIN — HEPARIN SODIUM 5000 UNIT(S): 5000 INJECTION INTRAVENOUS; SUBCUTANEOUS at 17:16

## 2021-05-05 RX ADMIN — Medication 1: at 12:42

## 2021-05-05 RX ADMIN — Medication 20 MILLIEQUIVALENT(S): at 12:42

## 2021-05-05 RX ADMIN — Medication 20 MILLIEQUIVALENT(S): at 14:30

## 2021-05-05 RX ADMIN — Medication 2: at 17:22

## 2021-05-05 RX ADMIN — Medication 650 MILLIGRAM(S): at 06:28

## 2021-05-05 RX ADMIN — Medication 1: at 07:47

## 2021-05-05 RX ADMIN — ATORVASTATIN CALCIUM 80 MILLIGRAM(S): 80 TABLET, FILM COATED ORAL at 21:31

## 2021-05-05 RX ADMIN — PIPERACILLIN AND TAZOBACTAM 25 GRAM(S): 4; .5 INJECTION, POWDER, LYOPHILIZED, FOR SOLUTION INTRAVENOUS at 21:31

## 2021-05-05 NOTE — CONSULT NOTE ADULT - REASON FOR ADMISSION
right sided abd pain since friday with ekg changes in pcp office

## 2021-05-05 NOTE — PROGRESS NOTE ADULT - SUBJECTIVE AND OBJECTIVE BOX
IR Post Procedure Note    Diagnosis: Acute Cholecystitis    Procedure: Percutaneous Cholecysostomy drain placement    : Jones Shahid MD    Contrast: 5 cc    Anesthesia: 1% Lidocaine Subcutaneous, Sedation administered by Anesthesiology    Estimated Blood Loss: Less than 10cc    Specimens: Identified, Labeled, Confirmed and Sent to Lab.    Complications: No Immediate Complications    Anticoagulation: Resume in 24 Hours    Findings & Plan: 10F drain placed in gallbladder via transhepatic approach. Confirmed w fluoroscopy. Bilious fluid draining. Continue gravity drainage and return for draincheck in 6-8 weeks.    Please call Interventional Radiology with any questions, concerns, or issues.

## 2021-05-05 NOTE — PROGRESS NOTE ADULT - SUBJECTIVE AND OBJECTIVE BOX
Patient is a 80y old  Male who presents with a chief complaint of right sided abd pain since friday with ekg changes in pcp office (05 May 2021 10:14)      INTERVAL HPI/OVERNIGHT EVENTS: stable, for percutatanous drainage of gb by ir, discussed with ir    MEDICATIONS  (STANDING):  aspirin enteric coated 81 milliGRAM(s) Oral daily  atorvastatin 80 milliGRAM(s) Oral at bedtime  dextrose 40% Gel 15 Gram(s) Oral once  dextrose 5%. 1000 milliLiter(s) (50 mL/Hr) IV Continuous <Continuous>  dextrose 5%. 1000 milliLiter(s) (100 mL/Hr) IV Continuous <Continuous>  dextrose 50% Injectable 25 Gram(s) IV Push once  dextrose 50% Injectable 12.5 Gram(s) IV Push once  dextrose 50% Injectable 25 Gram(s) IV Push once  glucagon  Injectable 1 milliGRAM(s) IntraMuscular once  insulin lispro (ADMELOG) corrective regimen sliding scale   SubCutaneous three times a day before meals  metoprolol succinate ER 50 milliGRAM(s) Oral daily  piperacillin/tazobactam IVPB.. 3.375 Gram(s) IV Intermittent every 8 hours  sodium chloride 0.9%. 1000 milliLiter(s) (60 mL/Hr) IV Continuous <Continuous>    MEDICATIONS  (PRN):  acetaminophen   Tablet .. 650 milliGRAM(s) Oral every 6 hours PRN Temp greater or equal to 38C (100.4F)  morphine  - Injectable 2 milliGRAM(s) IV Push every 6 hours PRN Severe Pain (7 - 10)  oxycodone    5 mG/acetaminophen 325 mG 1 Tablet(s) Oral every 4 hours PRN Mild Pain (1 - 3)  oxycodone    5 mG/acetaminophen 325 mG 2 Tablet(s) Oral every 6 hours PRN Moderate Pain (4 - 6)      Allergies    No Known Allergies    Intolerances        REVIEW OF SYSTEMS:  CONSTITUTIONAL: No fever, weight loss, or fatigue  EYES: No eye pain, visual disturbances  ENMT:  No difficulty hearing, tinnitus, vertigo; No sinus or throat pain  NECK: No pain or stiffness  RESPIRATORY: No cough, wheezing, chills or hemoptysis; No shortness of breath  CARDIOVASCULAR: No chest pain, palpitations, dizziness  GASTROINTESTINAL: abd pain  GENITOURINARY: No dysuria, frequency, hematuria, or incontinence  NEUROLOGICAL: No headaches, memory loss, loss of strength, numbness, or tremors  SKIN: No itching, burning  LYMPH NODES: No enlarged glands  MUSCULOSKELETAL: No joint pain or swelling; No muscle, back, or extremity pain  PSYCHIATRIC: No depression, mood swings  HEME/LYMPH: No easy bruising, or bleeding gums  ALLERGY AND IMMUNOLOGIC: No hives    Vital Signs Last 24 Hrs  T(C): 36.9 (05 May 2021 08:24), Max: 37.8 (05 May 2021 04:47)  T(F): 98.4 (05 May 2021 08:24), Max: 100.1 (05 May 2021 04:47)  HR: 71 (05 May 2021 08:24) (68 - 86)  BP: 132/68 (05 May 2021 08:24) (113/57 - 132/68)  BP(mean): --  RR: 19 (05 May 2021 08:24) (16 - 19)  SpO2: 97% (05 May 2021 08:24) (90% - 97%)    PHYSICAL EXAM:  GENERAL: NAD, well-groomed, well-developed  HEAD:  Atraumatic, Normocephalic  EYES: EOMI, PERRLA, conjunctiva and sclera clear  ENMT: No tonsillar erythema, exudates, or enlargement   NECK: Supple, No JVD  NERVOUS SYSTEM:  Alert & Oriented X3, Good concentration  CHEST/LUNG: Clear to auscultation bilaterally; No rales, rhonchi, wheezing  HEART: Regular rate and rhythm  ABDOMEN: soft, tender  EXTREMITIES:  2+ Peripheral Pulses   LYMPH: No lymphadenopathy noted  SKIN: No rashes     LABS:                        10.3   14.56 )-----------( 219      ( 05 May 2021 07:09 )             30.9     05 May 2021 07:09    144    |  108    |  26     ----------------------------<  153    3.4     |  26     |  1.50     Ca    8.5        05 May 2021 07:09    TPro  7.6    /  Alb  2.2    /  TBili  0.8    /  DBili  x      /  AST  40     /  ALT  29     /  AlkPhos  142    05 May 2021 07:09    PT/INR - ( 03 May 2021 14:48 )   PT: 13.3 sec;   INR: 1.14 ratio         PTT - ( 03 May 2021 14:48 )  PTT:29.6 sec  Urinalysis Basic - ( 03 May 2021 17:14 )    Color: Yellow / Appearance: Slightly Turbid / S.010 / pH: x  Gluc: x / Ketone: Trace  / Bili: Negative / Urobili: Negative   Blood: x / Protein: 30 mg/dL / Nitrite: Negative   Leuk Esterase: Moderate / RBC: 0-2 /HPF / WBC >50   Sq Epi: x / Non Sq Epi: Occasional / Bacteria: Many      CAPILLARY BLOOD GLUCOSE      POCT Blood Glucose.: 171 mg/dL (05 May 2021 07:40)  POCT Blood Glucose.: 175 mg/dL (05 May 2021 03:37)  POCT Blood Glucose.: 185 mg/dL (04 May 2021 16:57)  POCT Blood Glucose.: 209 mg/dL (04 May 2021 12:04)    blood culture --   >100,000 CFU/ml Enterobacter cloacae complex   05-03 @ 21:21    blood culture --   No growth to date.   05- @ 21:09      urine culture --  - @ 21:21  results   >100,000 CFU/ml Enterobacter cloacae complex 05-03 @ 21:21  urine culture --  05-03 @ 21:09  results   No growth to date. 05- @ 21:09    wound with gram statin --    - @ 21:21  organism  --   05- @ 21:21  specimen source .Urine Clean Catch (Midstream)  05- @ 21:21  wound with gram statin --    - @ 21:09  organism  --   05-03 @ 21:09  specimen source .Blood Blood-Peripheral  05- @ 21:09      RADIOLOGY & ADDITIONAL TESTS: jaxson noted      Consultant(s) Notes Reviewed:  [ x] YES  [ ] NO    Care Discussed with Consultants/Other Providers [ x] YES  [ ] NO    Advanced care planning discussed with patient and family, advanced care planning forms reviewed, discussed, and completed.  20 minutes spent.

## 2021-05-05 NOTE — CONSULT NOTE ADULT - ASSESSMENT
Incidental finding of 2cm right renal mass on non-contrast CT.  Renal sonogram to characterize mass (solid vs. cystic)  May need contrast CT when renal function has been optimized

## 2021-05-05 NOTE — CONSULT NOTE ADULT - SUBJECTIVE AND OBJECTIVE BOX
Patient is a 80y old  Male who presents with a chief complaint of right sided abd pain since friday with ekg changes in pcp office (05 May 2021 14:00)      HISTORY OF PRESENT ILLNESS:  79 y/o black male s/p cholecystotomy tube placement by IR earlier this AM for cholecystitis.  Pt feeling much better now.  CT w/o contrast showed an indeterminate 2cm right renal mass, exophytic, off the right midpole.  Pt has not had urologic problems/workup in the past.  Denies hematuria, incontinence, flank pain.  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    PAST MEDICAL & SURGICAL HISTORY:  HTN (hypertension)    Diabetes    CAD (coronary artery disease)    Cardiac pacemaker    S/P CABG (coronary artery bypass graft)        REVIEW OF SYSTEMS:    CONSTITUTIONAL: No weakness, fevers or chills  SKIN: No itching, burning, rashes, or lesions   EYES/ENT: No visual changes;  No vertigo or throat pain   NECK: No pain or stiffness  RESPIRATORY: No cough, wheezing, hemoptysis; No shortness of breath  CARDIOVASCULAR: No chest pain or palpitations  GASTROINTESTINAL: No abdominal or epigastric pain. No nausea, vomiting, diarrhea  NEUROLOGICAL: No numbness or weakness  GENITOURINARY:     No hematuria, dysuria, incontinence    All other review of systems is negative unless indicated above.    MEDICATIONS  (STANDING):  aspirin enteric coated 81 milliGRAM(s) Oral daily  atorvastatin 80 milliGRAM(s) Oral at bedtime  dextrose 40% Gel 15 Gram(s) Oral once  dextrose 5%. 1000 milliLiter(s) (50 mL/Hr) IV Continuous <Continuous>  dextrose 5%. 1000 milliLiter(s) (100 mL/Hr) IV Continuous <Continuous>  dextrose 50% Injectable 25 Gram(s) IV Push once  dextrose 50% Injectable 12.5 Gram(s) IV Push once  dextrose 50% Injectable 25 Gram(s) IV Push once  glucagon  Injectable 1 milliGRAM(s) IntraMuscular once  heparin   Injectable 5000 Unit(s) SubCutaneous every 12 hours  insulin lispro (ADMELOG) corrective regimen sliding scale   SubCutaneous three times a day before meals  metoprolol succinate ER 50 milliGRAM(s) Oral daily  piperacillin/tazobactam IVPB.. 3.375 Gram(s) IV Intermittent every 8 hours  sodium chloride 0.9%. 1000 milliLiter(s) (60 mL/Hr) IV Continuous <Continuous>    MEDICATIONS  (PRN):  acetaminophen   Tablet .. 650 milliGRAM(s) Oral every 6 hours PRN Temp greater or equal to 38C (100.4F)  morphine  - Injectable 2 milliGRAM(s) IV Push every 6 hours PRN Severe Pain (7 - 10)  oxycodone    5 mG/acetaminophen 325 mG 1 Tablet(s) Oral every 4 hours PRN Mild Pain (1 - 3)  oxycodone    5 mG/acetaminophen 325 mG 2 Tablet(s) Oral every 6 hours PRN Moderate Pain (4 - 6)      Allergies    No Known Allergies    Intolerances        SOCIAL HISTORY:   Smoking: quit in 1980s, smoked 1 pack/week for >30 yrs   EtOH: quit in 1990s   Work: former .  worked on floors.      FAMILY HISTORY:  No pertinent family history in first degree relatives  Denies h/o cancers in the family        Vital Signs Last 24 Hrs  T(C): 37.4 (05 May 2021 17:06), Max: 37.8 (05 May 2021 04:47)  T(F): 99.4 (05 May 2021 17:06), Max: 100.1 (05 May 2021 04:47)  HR: 79 (05 May 2021 17:06) (71 - 86)  BP: 145/72 (05 May 2021 17:06) (115/67 - 146/71)  BP(mean): --  RR: 18 (05 May 2021 17:06) (17 - 19)  SpO2: 92% (05 May 2021 17:06) (90% - 97%)    PHYSICAL EXAM:    Constitutional: NAD, pleasant  HEENT: PERRLA, EOMI  Neck: Supple, full ROM  Back: No CVA tenderness  Respiratory: Normal repiratory effort  Abd: Soft, NT/ND, (+)cholecystotomy tube draining brown fluid  : Normal phallus with excess foreskin (but pt maintains that he was circ'd),,open meatus,bilateral descended testes, non-tender  Extremities: No peripheral edema  Neurological: A&O x 3, no focal deficits  Psychiatric: Normal mood, normal affect  Musculoskeletal: no clubbing/cyanosis/edema  Skin: No rashes    LABS:                        10.3   14.56 )-----------( 219      ( 05 May 2021 07:09 )             30.9     05-05    144  |  108  |  26<H>  ----------------------------<  153<H>  3.4<L>   |  26  |  1.50<H>    Ca    8.5      05 May 2021 07:09    TPro  7.6  /  Alb  2.2<L>  /  TBili  0.8  /  DBili  x   /  AST  40<H>  /  ALT  29  /  AlkPhos  142<H>  05-05        RADIOLOGY & ADDITIONAL STUDIES:    EXAM:  CT ABDOMEN AND PELVIS                            PROCEDURE DATE:  05/03/2021          INTERPRETATION:  CLINICAL INFORMATION: abd pain    COMPARISON: None.    CONTRAST/COMPLICATIONS:  IV Contrast: NONE  0 cc administered   0 cc discarded  Oral Contrast: NONE  Complications: None reported at time of study completion    PROCEDURE:  CT of the Abdomen and Pelvis was performed.  Sagittal and coronal reformats were performed.    FINDINGS:    LOWER CHEST: Status post sternotomy.  Pacemaker leads.    LIVER: Within normal limits.  BILE DUCTS: Normal caliber.  GALLBLADDER: Cholelithiasis. Mild inflammatory changes.  SPLEEN: Within normal limits.  PANCREAS: Within normal limits.  ADRENALS: Within normal limits.  KIDNEYS/URETERS: Indeterminate 2 cm exophytic right midpole lesion.    BLADDER: Within normal limits.  REPRODUCTIVE ORGANS: Within normal limits.    BOWEL: No bowel obstruction. The appendix is normal.  PERITONEUM: No ascites.  VESSELS:  Within normal limits.  RETROPERITONEUM/LYMPH NODES: No lymphadenopathy.  ABDOMINAL WALL: Small inguinal hernias.  BONES: Advanced degenerative changes of the left hip.    IMPRESSION: Suspicious for acute cholecystitis.    Indeterminate 2 cm right renal lesion.    The findings of acute cholecystitis were discussed with Dr. BRIGIDO MAK on 5/3/2021 5:36 PM.

## 2021-05-05 NOTE — PROGRESS NOTE ADULT - SUBJECTIVE AND OBJECTIVE BOX
[facility] -- Hugo Dee, Neha, Armand, Lobito Funes, Cherelle Belle: Office # [phone]    Follow Up:   Preop Eval, Hx of CAD s/p CABG, CHB s/p PPM    Subjective/Observations: Patient seen and examined. Patient awake, alert, resting in bed. No complaints of chest pain, dyspnea, palpitations or dizziness. No signs of orthopnea or PND.     REVIEW OF SYSTEMS: All review of systems is negative for eye, ENT, GI, , allergic, dermatologic, musculoskeletal and neurologic except as described above    PAST MEDICAL & SURGICAL HISTORY:  HTN (hypertension)  Diabetes  CAD (coronary artery disease)  Cardiac pacemaker  S/P CABG (coronary artery bypass graft)    MEDICATIONS  (STANDING):  aspirin enteric coated 81 milliGRAM(s) Oral daily  atorvastatin 80 milliGRAM(s) Oral at bedtime  dextrose 40% Gel 15 Gram(s) Oral once  dextrose 5%. 1000 milliLiter(s) (50 mL/Hr) IV Continuous <Continuous>  dextrose 5%. 1000 milliLiter(s) (100 mL/Hr) IV Continuous <Continuous>  dextrose 50% Injectable 25 Gram(s) IV Push once  dextrose 50% Injectable 12.5 Gram(s) IV Push once  dextrose 50% Injectable 25 Gram(s) IV Push once  glucagon  Injectable 1 milliGRAM(s) IntraMuscular once  heparin   Injectable 5000 Unit(s) SubCutaneous every 12 hours  insulin lispro (ADMELOG) corrective regimen sliding scale   SubCutaneous three times a day before meals  metoprolol succinate ER 50 milliGRAM(s) Oral daily  piperacillin/tazobactam IVPB.. 3.375 Gram(s) IV Intermittent every 8 hours  potassium chloride    Tablet ER 20 milliEquivalent(s) Oral every 2 hours  sodium chloride 0.9%. 1000 milliLiter(s) (60 mL/Hr) IV Continuous <Continuous>    MEDICATIONS  (PRN):  acetaminophen   Tablet .. 650 milliGRAM(s) Oral every 6 hours PRN Temp greater or equal to 38C (100.4F)  morphine  - Injectable 2 milliGRAM(s) IV Push every 6 hours PRN Severe Pain (7 - 10)  oxycodone    5 mG/acetaminophen 325 mG 1 Tablet(s) Oral every 4 hours PRN Mild Pain (1 - 3)  oxycodone    5 mG/acetaminophen 325 mG 2 Tablet(s) Oral every 6 hours PRN Moderate Pain (4 - 6)    Allergies  No Known Allergies    Vital Signs Last 24 Hrs  T(C): 36.6 (05 May 2021 11:17), Max: 37.8 (05 May 2021 04:47)  T(F): 97.9 (05 May 2021 11:17), Max: 100.1 (05 May 2021 04:47)  HR: 73 (05 May 2021 11:17) (68 - 86)  BP: 146/71 (05 May 2021 11:17) (113/57 - 146/71)  BP(mean): --  RR: 18 (05 May 2021 11:17) (16 - 19)  SpO2: 94% (05 May 2021 11:17) (90% - 97%)  I&O's Summary    04 May 2021 07:01  -  05 May 2021 07:00  --------------------------------------------------------  IN: 1035 mL / OUT: 1180 mL / NET: -145 mL    05 May 2021 07:01  -  05 May 2021 13:12  --------------------------------------------------------  IN: 0 mL / OUT: 200 mL / NET: -200 mL    TELE: SR 70-80s   PHYSICAL EXAM:  Appearance: NAD, no distress, alert, Obese   HEENT: Moist Mucous Membranes, Anicteric  Cardiovascular: Regular rate and rhythm, Normal S1 S2, No JVD, No murmurs, No rubs, gallops or clicks  Respiratory: Non-labored, Clear to auscultation, No rales, No rhonchi, No wheezing.   Gastrointestinal:  Soft, Non-tender, + BS  Neurologic: Non-focal  Skin: Warm and dry, No visible rashes or ulcers, No ecchymosis, No cyanosis  Musculoskeletal: No clubbing, No cyanosis, No joint swelling/tenderness  Psychiatry: Mood & affect appropriate  Lymph: No peripheral edema.     LABS: All Labs Reviewed:                        10.3   14.56 )-----------( 219      ( 05 May 2021 07:09 )             30.9                         10.8   17.02 )-----------( 205      ( 04 May 2021 05:14 )             31.6                         12.0   19.63 )-----------( 228      ( 03 May 2021 12:50 )             35.5     05 May 2021 07:09    144    |  108    |  26     ----------------------------<  153    3.4     |  26     |  1.50   04 May 2021 05:14    141    |  104    |  30     ----------------------------<  240    3.8     |  26     |  1.90   03 May 2021 14:06    134    |  96     |  35     ----------------------------<  366    3.4     |  24     |  2.50     Ca    8.5        05 May 2021 07:09  Ca    7.7        04 May 2021 05:14  Ca    8.3        03 May 2021 14:06    TPro  7.6    /  Alb  2.2    /  TBili  0.8    /  DBili  x      /  AST  40     /  ALT  29     /  AlkPhos  142    05 May 2021 07:09  TPro  6.7    /  Alb  2.5    /  TBili  1.1    /  DBili  .40    /  AST  26     /  ALT  19     /  AlkPhos  128    04 May 2021 05:14  TPro  8.3    /  Alb  2.7    /  TBili  1.2    /  DBili  x      /  AST  30     /  ALT  23     /  AlkPhos  130    03 May 2021 14:06    PT/INR - ( 03 May 2021 14:48 )   PT: 13.3 sec;   INR: 1.14 ratio   PTT - ( 03 May 2021 14:48 )  PTT:29.6 sec  Creatine Kinase, Serum: 44 U/L (05-04-21 @ 12:07)  Troponin I, Serum: <.015 ng/mL (05-04-21 @ 12:07)  Creatine Kinase, Serum: 65 U/L (05-04-21 @ 05:14)  Lactate, Blood: 1.4 mmol/L (05-03-21 @ 19:06)  Lactate, Blood: 4.5 mmol/L (05-03-21 @ 14:06)    12 Lead ECG:   Ventricular Rate 82 BPM  Atrial Rate 82 BPM  P-R Interval 152 ms  QRS Duration 92 ms  Q-T Interval 354 ms  QTC Calculation(Bazett) 413 ms  P Axis 59 degrees  R Axis 6 degrees  T Axis 62 degrees  Diagnosis Line Normal sinus rhythm  Nonspecific T wave abnormality  Abnormal ECG  Confirmed by david Belle ([id number]) on 5/4/2021 3:44:47 PM (05-03-21 @ 17:08)    < from: TTE Echo Complete w/o Contrast w/ Doppler (05.04.21 @ 11:16) >  Dimensions:  LA 3.7       Normal Values: 2.0 - 4.0 cm  Ao 2.8        Normal Values: 2.0 - 3.8 cm  SEPTUM 0.9       Normal Values: 0.6 - 1.2 cm  PWT 1.0       Normal Values: 0.6 - 1.1 cm  LVIDd 4.7         Normal Values: 3.0 - 5.6 cm  LVIDs 3.7         Normal Values: 1.8 - 4.0 cm    OBSERVATIONS:  Mitral Valve: Mild MR.  Aortic Valve/Aorta: Sclerotic trileaflet aortic valve with grossly normal opening.  Tricuspid Valve: Mild TR.  Pulmonic Valve: Not well-visualized  Left Atrium: normal  Right Atrium: Not well-visualized  Left Ventricle: normal LV size and systolic function, estimated LVEF of 55%.  Right Ventricle: Grossly normal size and systolic function. A device wire is noted within the right heart  Pericardium: no significant pericardial effusion.  Pulmonary/RV Pressure: estimated PA systolic pressure of 48 mmHg  LV diastolic dysfunction is present    IMPRESSION:  Normal left ventricular internal dimensions and systolic function, estimated LVEF of 55%.  Grossly normal RV size and systolic function. A device wire is noted within the right heart  Sclerotic trileaflet aortic valve, without AI.  Mild MR and TR.  No significant pericardial effusion.    < end of copied text >

## 2021-05-05 NOTE — CONSULT NOTE ADULT - CONSULT REQUESTED DATE/TIME
03-May-2021 12:52
04-May-2021 09:46
03-May-2021 17:15
04-May-2021 16:10
05-May-2021
Abnormal VS & WBC

## 2021-05-05 NOTE — PROGRESS NOTE ADULT - PROBLEM SELECTOR PLAN 1
Pt with cholecystitis per imaging and clinical symptoms  sx eval noted for ir drainage  labs noted  ivf  npo   iv abx  trend labs  pain control

## 2021-05-05 NOTE — PROGRESS NOTE ADULT - SUBJECTIVE AND OBJECTIVE BOX
Patient is a 80y old  Male who presents with a chief complaint of right sided abd pain since friday with ekg changes in pcp office (05 May 2021 13:12)    Patient seen in follow up for       PAST MEDICAL HISTORY:  HTN (hypertension)    Diabetes    CAD (coronary artery disease)    Cardiac pacemaker      MEDICATIONS  (STANDING):  aspirin enteric coated 81 milliGRAM(s) Oral daily  atorvastatin 80 milliGRAM(s) Oral at bedtime  dextrose 40% Gel 15 Gram(s) Oral once  dextrose 5%. 1000 milliLiter(s) (50 mL/Hr) IV Continuous <Continuous>  dextrose 5%. 1000 milliLiter(s) (100 mL/Hr) IV Continuous <Continuous>  dextrose 50% Injectable 25 Gram(s) IV Push once  dextrose 50% Injectable 12.5 Gram(s) IV Push once  dextrose 50% Injectable 25 Gram(s) IV Push once  glucagon  Injectable 1 milliGRAM(s) IntraMuscular once  heparin   Injectable 5000 Unit(s) SubCutaneous every 12 hours  insulin lispro (ADMELOG) corrective regimen sliding scale   SubCutaneous three times a day before meals  metoprolol succinate ER 50 milliGRAM(s) Oral daily  piperacillin/tazobactam IVPB.. 3.375 Gram(s) IV Intermittent every 8 hours  potassium chloride    Tablet ER 20 milliEquivalent(s) Oral every 2 hours  sodium chloride 0.9%. 1000 milliLiter(s) (60 mL/Hr) IV Continuous <Continuous>    MEDICATIONS  (PRN):  acetaminophen   Tablet .. 650 milliGRAM(s) Oral every 6 hours PRN Temp greater or equal to 38C (100.4F)  morphine  - Injectable 2 milliGRAM(s) IV Push every 6 hours PRN Severe Pain (7 - 10)  oxycodone    5 mG/acetaminophen 325 mG 1 Tablet(s) Oral every 4 hours PRN Mild Pain (1 - 3)  oxycodone    5 mG/acetaminophen 325 mG 2 Tablet(s) Oral every 6 hours PRN Moderate Pain (4 - 6)    T(C): 36.6 (21 @ 11:17), Max: 38.2 (21 @ 05:40)  HR: 73 (21 @ 11:17) (68 - 92)  BP: 146/71 (21 @ 11:17) (113/57 - 146/71)  RR: 18 (21 @ 11:17) (16 - 19)  SpO2: 94% (21 @ 11:17) (90% - 97%)  Wt(kg): --  I&O's Detail    04 May 2021 07:01  -  05 May 2021 07:00  --------------------------------------------------------  IN:    IV PiggyBack: 75 mL    sodium chloride 0.9%: 960 mL  Total IN: 1035 mL    OUT:    Voided (mL): 1180 mL  Total OUT: 1180 mL    Total NET: -145 mL      05 May 2021 07:01  -  05 May 2021 14:00  --------------------------------------------------------  IN:  Total IN: 0 mL    OUT:    Voided (mL): 200 mL  Total OUT: 200 mL    Total NET: -200 mL          PHYSICAL EXAM:  General: No distress  Respiratory: b/l air entry  Cardiovascular: S1 S2  Gastrointestinal: soft, + drain  Extremities:  edema                              10.3   14.56 )-----------( 219      ( 05 May 2021 07:09 )             30.9         144  |  108  |  26<H>  ----------------------------<  153<H>  3.4<L>   |  26  |  1.50<H>    Ca    8.5      05 May 2021 07:09    TPro  7.6  /  Alb  2.2<L>  /  TBili  0.8  /  DBili  x   /  AST  40<H>  /  ALT  29  /  AlkPhos  142<H>  05-    CARDIAC MARKERS ( 04 May 2021 12:07 )  <.015 ng/mL / x     / 44 U/L / x     / x      CARDIAC MARKERS ( 04 May 2021 05:14 )  <.015 ng/mL / x     / 65 U/L / x     / x      CARDIAC MARKERS ( 03 May 2021 20:47 )  <.015 ng/mL / x     / 51 U/L / x     / x      CARDIAC MARKERS ( 03 May 2021 19:08 )  <.015 ng/mL / x     / 58 U/L / x     / <1.0 ng/mL  CARDIAC MARKERS ( 03 May 2021 14:06 )  <.015 ng/mL / x     / 70 U/L / x     / <1.0 ng/mL      LIVER FUNCTIONS - ( 05 May 2021 07:09 )  Alb: 2.2 g/dL / Pro: 7.6 g/dL / ALK PHOS: 142 U/L / ALT: 29 U/L / AST: 40 U/L / GGT: x           Urinalysis Basic - ( 03 May 2021 17:14 )    Color: Yellow / Appearance: Slightly Turbid / S.010 / pH: x  Gluc: x / Ketone: Trace  / Bili: Negative / Urobili: Negative   Blood: x / Protein: 30 mg/dL / Nitrite: Negative   Leuk Esterase: Moderate / RBC: 0-2 /HPF / WBC >50   Sq Epi: x / Non Sq Epi: Occasional / Bacteria: Many        Sodium, Serum: 144 ( @ 07:09)  Sodium, Serum: 141 ( @ 05:14)  Sodium, Serum: 134 ( @ 14:06)    Creatinine, Serum: 1.50 ( @ 07:09)  Creatinine, Serum: 1.90 ( @ 05:14)  Creatinine, Serum: 2.50 ( @ 14:06)    Potassium, Serum: 3.4 ( 07:09)  Potassium, Serum: 3.8 ( @ 05:14)  Potassium, Serum: 3.4 ( @ 14:06)    Hemoglobin: 10.3 ( @ 07:09)  Hemoglobin: 10.8 ( @ 05:14)  Hemoglobin: 12.0 ( @ 12:50)

## 2021-05-05 NOTE — PHYSICAL THERAPY INITIAL EVALUATION ADULT - PERTINENT HX OF CURRENT PROBLEM, REHAB EVAL
79yo M ho DM, HTN, CAD, CABG, PPM, presents with 3 days of right sided pain, had nausea and vomiting 2 days ago, but that resolved. went to pcp for this right sided pain who noted ekg changes with st depressions in lateral leads so sent to ED. pt states he has been having erratic breathing but not new. denies fevers, chills, cough. no abd surgeries, no urinary sx

## 2021-05-05 NOTE — PROGRESS NOTE ADULT - SUBJECTIVE AND OBJECTIVE BOX
pt seen  feeling slightly better  -n-v  ICU Vital Signs Last 24 Hrs  T(C): 36.9 (05 May 2021 08:24), Max: 37.8 (05 May 2021 04:47)  T(F): 98.4 (05 May 2021 08:24), Max: 100.1 (05 May 2021 04:47)  HR: 71 (05 May 2021 08:24) (68 - 86)  BP: 132/68 (05 May 2021 08:24) (113/57 - 132/68)  BP(mean): --  ABP: --  ABP(mean): --  RR: 19 (05 May 2021 08:24) (16 - 19)  SpO2: 97% (05 May 2021 08:24) (90% - 97%)  gen-NAD  resp-clear  abd-soft, +RUQ tenderness                          10.3   14.56 )-----------( 219      ( 05 May 2021 07:09 )             30.9

## 2021-05-05 NOTE — PROGRESS NOTE ADULT - PROBLEM SELECTOR PLAN 1
S/P Percutaneous drain  May restart diet, as per Blaise  Will continue to follow up.   Will discuss with Dr. Welsh.

## 2021-05-05 NOTE — PROGRESS NOTE ADULT - SUBJECTIVE AND OBJECTIVE BOX
Post Operative Note  Patient: AYDE ESQUIVEL 80y (1940) Male   MRN: [id number]  Location: \[facility]"" TEL[facility] [address]  Visit: 05-03-21 Inpatient  Date: 05-05-21 @ 13:59    Procedure: S/P Ir placement of cholecystostomy tube.     Subjective:   Patient seen and examined at bedside, reports feeling well.  States he tolerated the procedure well.  No complaints at this time. WOuld like to drink something as he has been NPO since last night.    Patient denies dizziness, chest pain, sob, nausea, vomiting, abdominal pain, or urinary complaints.    Objective:  Vitals: T(F): 97.9 (05-05-21 @ 11:17), Max: 100.1 (05-05-21 @ 04:47)  HR: 73 (05-05-21 @ 11:17)  BP: 146/71 (05-05-21 @ 11:17) (113/57 - 146/71)  RR: 18 (05-05-21 @ 11:17)  SpO2: 94% (05-05-21 @ 11:17)  Vent Settings:     In:   05-04-21 @ 07:01  -  05-05-21 @ 07:00  --------------------------------------------------------  IN: 1035 mL    05-05-21 @ 07:01  -  05-05-21 @ 13:59  --------------------------------------------------------  IN: 0 mL      IV Fluids: dextrose 5%. 1000 milliLiter(s) (50 mL/Hr) IV Continuous <Continuous>  dextrose 5%. 1000 milliLiter(s) (100 mL/Hr) IV Continuous <Continuous>  potassium chloride    Tablet ER 20 milliEquivalent(s) Oral every 2 hours  sodium chloride 0.9%. 1000 milliLiter(s) (60 mL/Hr) IV Continuous <Continuous>      Out:   05-04-21 @ 07:01  -  05-05-21 @ 07:00  --------------------------------------------------------  OUT: 1180 mL    05-05-21 @ 07:01  -  05-05-21 @ 13:59  --------------------------------------------------------  OUT: 200 mL      EBL:     Voided Urine:   05-04-21 @ 07:01  -  05-05-21 @ 07:00  --------------------------------------------------------  OUT: 1180 mL    05-05-21 @ 07:01  -  05-05-21 @ 13:59  --------------------------------------------------------  OUT: 200 mL      Blank Catheter: yes no   Drains:   CHALINO:    ,   Chest Tube:      NG Tube:       PHYSICAL EXAM  GENERAL:  Well-nourished, well-developed Male lying comfortably in bed in NAD  ABDOMEN:  Protuberant abdomen.  BS+ Soft, NT.  Cholecystostomy tube on right side of abdomen.  Drain with bilious fluid in place.   EXTREMITIES: No calf tenderness  SKIN:  No jaundice, pallor, or cyanosis  NEURO:  A&O x 3  Surgical dressings clean, dry, intact.    Medications: [Standing]  acetaminophen   Tablet .. 650 milliGRAM(s) Oral every 6 hours PRN  aspirin enteric coated 81 milliGRAM(s) Oral daily  atorvastatin 80 milliGRAM(s) Oral at bedtime  dextrose 40% Gel 15 Gram(s) Oral once  dextrose 5%. 1000 milliLiter(s) IV Continuous <Continuous>  dextrose 5%. 1000 milliLiter(s) IV Continuous <Continuous>  dextrose 50% Injectable 25 Gram(s) IV Push once  dextrose 50% Injectable 12.5 Gram(s) IV Push once  dextrose 50% Injectable 25 Gram(s) IV Push once  glucagon  Injectable 1 milliGRAM(s) IntraMuscular once  heparin   Injectable 5000 Unit(s) SubCutaneous every 12 hours  insulin lispro (ADMELOG) corrective regimen sliding scale   SubCutaneous three times a day before meals  metoprolol succinate ER 50 milliGRAM(s) Oral daily  morphine  - Injectable 2 milliGRAM(s) IV Push every 6 hours PRN  oxycodone    5 mG/acetaminophen 325 mG 1 Tablet(s) Oral every 4 hours PRN  oxycodone    5 mG/acetaminophen 325 mG 2 Tablet(s) Oral every 6 hours PRN  piperacillin/tazobactam IVPB.. 3.375 Gram(s) IV Intermittent every 8 hours  potassium chloride    Tablet ER 20 milliEquivalent(s) Oral every 2 hours  sodium chloride 0.9%. 1000 milliLiter(s) IV Continuous <Continuous>    Medications: [PRN]  acetaminophen   Tablet .. 650 milliGRAM(s) Oral every 6 hours PRN  aspirin enteric coated 81 milliGRAM(s) Oral daily  atorvastatin 80 milliGRAM(s) Oral at bedtime  dextrose 40% Gel 15 Gram(s) Oral once  dextrose 5%. 1000 milliLiter(s) IV Continuous <Continuous>  dextrose 5%. 1000 milliLiter(s) IV Continuous <Continuous>  dextrose 50% Injectable 25 Gram(s) IV Push once  dextrose 50% Injectable 12.5 Gram(s) IV Push once  dextrose 50% Injectable 25 Gram(s) IV Push once  glucagon  Injectable 1 milliGRAM(s) IntraMuscular once  heparin   Injectable 5000 Unit(s) SubCutaneous every 12 hours  insulin lispro (ADMELOG) corrective regimen sliding scale   SubCutaneous three times a day before meals  metoprolol succinate ER 50 milliGRAM(s) Oral daily  morphine  - Injectable 2 milliGRAM(s) IV Push every 6 hours PRN  oxycodone    5 mG/acetaminophen 325 mG 1 Tablet(s) Oral every 4 hours PRN  oxycodone    5 mG/acetaminophen 325 mG 2 Tablet(s) Oral every 6 hours PRN  piperacillin/tazobactam IVPB.. 3.375 Gram(s) IV Intermittent every 8 hours  potassium chloride    Tablet ER 20 milliEquivalent(s) Oral every 2 hours  sodium chloride 0.9%. 1000 milliLiter(s) IV Continuous <Continuous>    Labs:                        10.3   14.56 )-----------( 219      ( 05 May 2021 07:09 )             30.9     05-05    144  |  108  |  26<H>  ----------------------------<  153<H>  3.4<L>   |  26  |  1.50<H>    Ca    8.5      05 May 2021 07:09    TPro  7.6  /  Alb  2.2<L>  /  TBili  0.8  /  DBili  x   /  AST  40<H>  /  ALT  29  /  AlkPhos  142<H>  05-05    PT/INR - ( 03 May 2021 14:48 )   PT: 13.3 sec;   INR: 1.14 ratio         PTT - ( 03 May 2021 14:48 )  PTT:29.6 sec  CARDIAC MARKERS ( 04 May 2021 12:07 )  <.015 ng/mL / x     / 44 U/L / x     / x      CARDIAC MARKERS ( 04 May 2021 05:14 )  <.015 ng/mL / x     / 65 U/L / x     / x      CARDIAC MARKERS ( 03 May 2021 20:47 )  <.015 ng/mL / x     / 51 U/L / x     / x      CARDIAC MARKERS ( 03 May 2021 19:08 )  <.015 ng/mL / x     / 58 U/L / x     / <1.0 ng/mL  CARDIAC MARKERS ( 03 May 2021 14:06 )  <.015 ng/mL / x     / 70 U/L / x     / <1.0 ng/mL      Imaging:  No post-op imaging studies

## 2021-05-05 NOTE — PROGRESS NOTE ADULT - PROBLEM SELECTOR PLAN 6
new onset per old labs  ivf  hold nephrotoxins including arb and diuretics  renal eval noted  ct noted  renal eval for renal leison  trend labs new onset per old labs  ivf  hold nephrotoxins including arb and diuretics  renal eval noted  ct noted   eval for renal leison  trend labs

## 2021-05-06 LAB
ANION GAP SERPL CALC-SCNC: 9 MMOL/L — SIGNIFICANT CHANGE UP (ref 5–17)
BUN SERPL-MCNC: 19 MG/DL — SIGNIFICANT CHANGE UP (ref 7–23)
CALCIUM SERPL-MCNC: 8.2 MG/DL — LOW (ref 8.5–10.1)
CHLORIDE SERPL-SCNC: 107 MMOL/L — SIGNIFICANT CHANGE UP (ref 96–108)
CO2 SERPL-SCNC: 24 MMOL/L — SIGNIFICANT CHANGE UP (ref 22–31)
CREAT SERPL-MCNC: 1.2 MG/DL — SIGNIFICANT CHANGE UP (ref 0.5–1.3)
GLUCOSE SERPL-MCNC: 157 MG/DL — HIGH (ref 70–99)
HCT VFR BLD CALC: 29.7 % — LOW (ref 39–50)
HGB BLD-MCNC: 10 G/DL — LOW (ref 13–17)
MCHC RBC-ENTMCNC: 30.1 PG — SIGNIFICANT CHANGE UP (ref 27–34)
MCHC RBC-ENTMCNC: 33.7 GM/DL — SIGNIFICANT CHANGE UP (ref 32–36)
MCV RBC AUTO: 89.5 FL — SIGNIFICANT CHANGE UP (ref 80–100)
NRBC # BLD: 0 /100 WBCS — SIGNIFICANT CHANGE UP (ref 0–0)
PLATELET # BLD AUTO: 225 K/UL — SIGNIFICANT CHANGE UP (ref 150–400)
POTASSIUM SERPL-MCNC: 4.4 MMOL/L — SIGNIFICANT CHANGE UP (ref 3.5–5.3)
POTASSIUM SERPL-SCNC: 4.4 MMOL/L — SIGNIFICANT CHANGE UP (ref 3.5–5.3)
RBC # BLD: 3.32 M/UL — LOW (ref 4.2–5.8)
RBC # FLD: 15.6 % — HIGH (ref 10.3–14.5)
SODIUM SERPL-SCNC: 140 MMOL/L — SIGNIFICANT CHANGE UP (ref 135–145)
WBC # BLD: 9.63 K/UL — SIGNIFICANT CHANGE UP (ref 3.8–10.5)
WBC # FLD AUTO: 9.63 K/UL — SIGNIFICANT CHANGE UP (ref 3.8–10.5)

## 2021-05-06 PROCEDURE — 99232 SBSQ HOSP IP/OBS MODERATE 35: CPT

## 2021-05-06 PROCEDURE — 76775 US EXAM ABDO BACK WALL LIM: CPT | Mod: 26

## 2021-05-06 RX ORDER — ASPIRIN/CALCIUM CARB/MAGNESIUM 324 MG
1 TABLET ORAL
Qty: 0 | Refills: 0 | DISCHARGE

## 2021-05-06 RX ORDER — AMLODIPINE BESYLATE 2.5 MG/1
1 TABLET ORAL
Qty: 0 | Refills: 0 | DISCHARGE

## 2021-05-06 RX ORDER — ATORVASTATIN CALCIUM 80 MG/1
1 TABLET, FILM COATED ORAL
Qty: 0 | Refills: 0 | DISCHARGE

## 2021-05-06 RX ORDER — METOPROLOL TARTRATE 50 MG
1 TABLET ORAL
Qty: 0 | Refills: 0 | DISCHARGE

## 2021-05-06 RX ORDER — LOSARTAN POTASSIUM 100 MG/1
1 TABLET, FILM COATED ORAL
Qty: 0 | Refills: 0 | DISCHARGE

## 2021-05-06 RX ORDER — ASPIRIN/CALCIUM CARB/MAGNESIUM 324 MG
0 TABLET ORAL
Qty: 0 | Refills: 0 | DISCHARGE

## 2021-05-06 RX ORDER — DAPAGLIFLOZIN 10 MG/1
1 TABLET, FILM COATED ORAL
Qty: 0 | Refills: 0 | DISCHARGE

## 2021-05-06 RX ADMIN — PIPERACILLIN AND TAZOBACTAM 25 GRAM(S): 4; .5 INJECTION, POWDER, LYOPHILIZED, FOR SOLUTION INTRAVENOUS at 13:25

## 2021-05-06 RX ADMIN — Medication 2: at 12:08

## 2021-05-06 RX ADMIN — ATORVASTATIN CALCIUM 80 MILLIGRAM(S): 80 TABLET, FILM COATED ORAL at 21:33

## 2021-05-06 RX ADMIN — Medication 1: at 08:09

## 2021-05-06 RX ADMIN — HEPARIN SODIUM 5000 UNIT(S): 5000 INJECTION INTRAVENOUS; SUBCUTANEOUS at 17:16

## 2021-05-06 RX ADMIN — PIPERACILLIN AND TAZOBACTAM 25 GRAM(S): 4; .5 INJECTION, POWDER, LYOPHILIZED, FOR SOLUTION INTRAVENOUS at 05:17

## 2021-05-06 RX ADMIN — Medication 50 MILLIGRAM(S): at 05:17

## 2021-05-06 RX ADMIN — Medication 1: at 17:15

## 2021-05-06 RX ADMIN — HEPARIN SODIUM 5000 UNIT(S): 5000 INJECTION INTRAVENOUS; SUBCUTANEOUS at 05:17

## 2021-05-06 RX ADMIN — Medication 81 MILLIGRAM(S): at 12:09

## 2021-05-06 NOTE — PROGRESS NOTE ADULT - SUBJECTIVE AND OBJECTIVE BOX
Patient is a 80y old  Male who presents with a chief complaint of right sided abd pain since friday with ekg changes in pcp office (06 May 2021 10:04)      INTERVAL HPI/OVERNIGHT EVENTS: stable, feels well, draine tube draining well, gu noted    MEDICATIONS  (STANDING):  aspirin enteric coated 81 milliGRAM(s) Oral daily  atorvastatin 80 milliGRAM(s) Oral at bedtime  dextrose 40% Gel 15 Gram(s) Oral once  dextrose 5%. 1000 milliLiter(s) (50 mL/Hr) IV Continuous <Continuous>  dextrose 5%. 1000 milliLiter(s) (100 mL/Hr) IV Continuous <Continuous>  dextrose 50% Injectable 25 Gram(s) IV Push once  dextrose 50% Injectable 12.5 Gram(s) IV Push once  dextrose 50% Injectable 25 Gram(s) IV Push once  glucagon  Injectable 1 milliGRAM(s) IntraMuscular once  heparin   Injectable 5000 Unit(s) SubCutaneous every 12 hours  insulin lispro (ADMELOG) corrective regimen sliding scale   SubCutaneous three times a day before meals  metoprolol succinate ER 50 milliGRAM(s) Oral daily  piperacillin/tazobactam IVPB.. 3.375 Gram(s) IV Intermittent every 8 hours  sodium chloride 0.9%. 1000 milliLiter(s) (60 mL/Hr) IV Continuous <Continuous>    MEDICATIONS  (PRN):  acetaminophen   Tablet .. 650 milliGRAM(s) Oral every 6 hours PRN Temp greater or equal to 38C (100.4F)  morphine  - Injectable 2 milliGRAM(s) IV Push every 6 hours PRN Severe Pain (7 - 10)  oxycodone    5 mG/acetaminophen 325 mG 1 Tablet(s) Oral every 4 hours PRN Mild Pain (1 - 3)  oxycodone    5 mG/acetaminophen 325 mG 2 Tablet(s) Oral every 6 hours PRN Moderate Pain (4 - 6)      Allergies    No Known Allergies    Intolerances        REVIEW OF SYSTEMS:  CONSTITUTIONAL: No fever, weight loss, or fatigue  EYES: No eye pain, visual disturbances  ENMT:  No difficulty hearing, tinnitus, vertigo; No sinus or throat pain  NECK: No pain or stiffness  RESPIRATORY: No cough, wheezing, chills or hemoptysis; No shortness of breath  CARDIOVASCULAR: No chest pain, palpitations, dizziness  GASTROINTESTINAL: No abdominal or epigastric pain. No nausea, vomiting, or hematemesis; No diarrhea or constipation. No melena or hematochezia.  GENITOURINARY: No dysuria, frequency, hematuria, or incontinence  NEUROLOGICAL: No headaches, memory loss, loss of strength, numbness, or tremors  SKIN: No itching, burning  LYMPH NODES: No enlarged glands  MUSCULOSKELETAL: No joint pain or swelling; No muscle, back, or extremity pain  PSYCHIATRIC: No depression, mood swings  HEME/LYMPH: No easy bruising, or bleeding gums  ALLERGY AND IMMUNOLOGIC: No hives    Vital Signs Last 24 Hrs  T(C): 36.9 (06 May 2021 05:05), Max: 37.4 (05 May 2021 17:06)  T(F): 98.5 (06 May 2021 05:05), Max: 99.4 (05 May 2021 17:06)  HR: 80 (06 May 2021 05:05) (73 - 80)  BP: 145/71 (06 May 2021 05:05) (145/71 - 146/71)  BP(mean): --  RR: 18 (06 May 2021 05:05) (18 - 18)  SpO2: 90% (06 May 2021 05:05) (90% - 94%)    PHYSICAL EXAM:  GENERAL: NAD, well-groomed, well-developed  HEAD:  Atraumatic, Normocephalic  EYES: EOMI, PERRLA, conjunctiva and sclera clear  ENMT: No tonsillar erythema, exudates, or enlargement   NECK: Supple, No JVD  NERVOUS SYSTEM:  Alert & Oriented X3, Good concentration  CHEST/LUNG: Clear to auscultation bilaterally; No rales, rhonchi, wheezing  HEART: Regular rate and rhythm  ABDOMEN: drain tube draining well, no pain  EXTREMITIES:  2+ Peripheral Pulses   LYMPH: No lymphadenopathy noted  SKIN: No rashes     LABS:                        10.0   9.63  )-----------( 225      ( 06 May 2021 06:43 )             29.7     06 May 2021 06:43    140    |  107    |  19     ----------------------------<  157    4.4     |  24     |  1.20     Ca    8.2        06 May 2021 06:43          CAPILLARY BLOOD GLUCOSE      POCT Blood Glucose.: 160 mg/dL (06 May 2021 08:01)  POCT Blood Glucose.: 158 mg/dL (05 May 2021 20:49)  POCT Blood Glucose.: 232 mg/dL (05 May 2021 16:38)  POCT Blood Glucose.: 192 mg/dL (05 May 2021 12:01)    blood culture --   >100,000 CFU/ml Enterobacter cloacae complex   05-03 @ 21:21    blood culture --   No growth to date.   05-03 @ 21:09      urine culture --  05-03 @ 21:21  results   >100,000 CFU/ml Enterobacter cloacae complex 05-03 @ 21:21  urine culture --  05-03 @ 21:09  results   No growth to date. 05-03 @ 21:09    wound with gram statin --    05-05 @ 16:26  organism  --   05-05 @ 16:26  specimen source .Body Fluid Bile Fluid  05-05 @ 16:26  wound with gram statin --    05-03 @ 21:21  organism  Enterobacter cloacae complex   05-03 @ 21:21  specimen source .Urine Clean Catch (Midstream)  05-03 @ 21:21  wound with gram statin --    05-03 @ 21:09  organism  --   05-03 @ 21:09  specimen source .Blood Blood-Peripheral  05-03 @ 21:09      RADIOLOGY & ADDITIONAL TESTS:      Consultant(s) Notes Reviewed:  [x ] YES  [ ] NO    Care Discussed with Consultants/Other Providers [x ] YES  [ ] NO    Advanced care planning discussed with patient and family, advanced care planning forms reviewed, discussed, and completed.  20 minutes spent.

## 2021-05-06 NOTE — PROGRESS NOTE ADULT - PROBLEM SELECTOR PLAN 6
improved continue ivf  renal fu noted  trend labs  gu eval noted for renal mass  us noted  ct with contrast ordered for further evaluate- further recs per gu

## 2021-05-06 NOTE — PROGRESS NOTE ADULT - SUBJECTIVE AND OBJECTIVE BOX
INTERVAL Hx:  79 y/o male s/p cholecystotomy tube placement yesterday, feeling well.  Pt with incidental finding of 2cm right renal mass on non-contrast CT.  Renal u/s ordered (see results below)    MEDICATIONS  (STANDING):  aspirin enteric coated 81 milliGRAM(s) Oral daily  atorvastatin 80 milliGRAM(s) Oral at bedtime  dextrose 40% Gel 15 Gram(s) Oral once  dextrose 5%. 1000 milliLiter(s) (50 mL/Hr) IV Continuous <Continuous>  dextrose 5%. 1000 milliLiter(s) (100 mL/Hr) IV Continuous <Continuous>  dextrose 50% Injectable 25 Gram(s) IV Push once  dextrose 50% Injectable 12.5 Gram(s) IV Push once  dextrose 50% Injectable 25 Gram(s) IV Push once  glucagon  Injectable 1 milliGRAM(s) IntraMuscular once  heparin   Injectable 5000 Unit(s) SubCutaneous every 12 hours  insulin lispro (ADMELOG) corrective regimen sliding scale   SubCutaneous three times a day before meals  metoprolol succinate ER 50 milliGRAM(s) Oral daily  piperacillin/tazobactam IVPB.. 3.375 Gram(s) IV Intermittent every 8 hours  sodium chloride 0.9%. 1000 milliLiter(s) (60 mL/Hr) IV Continuous <Continuous>    MEDICATIONS  (PRN):  acetaminophen   Tablet .. 650 milliGRAM(s) Oral every 6 hours PRN Temp greater or equal to 38C (100.4F)  morphine  - Injectable 2 milliGRAM(s) IV Push every 6 hours PRN Severe Pain (7 - 10)  oxycodone    5 mG/acetaminophen 325 mG 1 Tablet(s) Oral every 4 hours PRN Mild Pain (1 - 3)  oxycodone    5 mG/acetaminophen 325 mG 2 Tablet(s) Oral every 6 hours PRN Moderate Pain (4 - 6)        Vital Signs Last 24 Hrs  T(C): 36.9 (06 May 2021 05:05), Max: 37.4 (05 May 2021 17:06)  T(F): 98.5 (06 May 2021 05:05), Max: 99.4 (05 May 2021 17:06)  HR: 80 (06 May 2021 05:05) (79 - 80)  BP: 145/71 (06 May 2021 05:05) (145/71 - 145/72)  BP(mean): --  RR: 18 (06 May 2021 05:05) (18 - 18)  SpO2: 90% (06 May 2021 05:05) (90% - 92%)    PHYSICAL EXAM:    ABDOMEN: Cholecystotomy tube functioning well    Blank: no    LABS:                        10.0   9.63  )-----------( 225      ( 06 May 2021 06:43 )             29.7     05-06    140  |  107  |  19  ----------------------------<  157<H>  4.4   |  24  |  1.20    Ca    8.2<L>      06 May 2021 06:43    TPro  7.6  /  Alb  2.2<L>  /  TBili  0.8  /  DBili  x   /  AST  40<H>  /  ALT  29  /  AlkPhos  142<H>  05-05    Urine culture:  05-03 @ 21:21 --   >100,000 CFU/ml Enterobacter cloacae complex  Urine culture:  05-03 @ 21:09 --   No growth to date.      Review of Systems:    CONSTITUTIONAL: No weakness, fevers or chills    SKIN: No itching, burning, rashes, or lesions     EYES/ENT: No visual changes;  No vertigo or throat pain     NECK: No pain or stiffness    RESPIRATORY: No cough, wheezing, hemoptysis; No shortness of breath    CARDIOVASCULAR: No chest pain or palpitations    GASTROINTESTINAL: No abdominal or epigastric pain. No nausea, vomiting, diarrhea    NEUROLOGICAL: No numbness or weakness    RADIOLOGY & ADDITIONAL TESTS:    EXAM:  US KIDNEY(S)                            PROCEDURE DATE:  05/06/2021          INTERPRETATION:  CLINICAL INFORMATION: Indeterminate lesion right kidney on CT of 5/3/2021    COMPARISON: None available.    TECHNIQUE: Sonography of the kidneys and bladder.    FINDINGS:    Right kidney 11.6 the left 11.2 cm long dimension.  No hydronephrosis or shadowing renal calculus bilaterally. There is a small simple cortical cyst right kidney lower pole measuring 1.1 cm.  There is an exophytic hypoechoiccortical lesion interpolar cortex measuring 2.2 x 2.4 cm. This corresponds to the aforementioned CT abnormality. There are some internal echoes. This may represent a complex (hemorrhagic or proteinaceous) cyst or a solid lesion. Follow-up with a dedicated contrast-enhanced renal CT or MR is suggested.  Urinary bladder partially distended grossly unremarkable.    IMPRESSION:    Indeterminate exophytic hypoechoic lesion interpolar cortex right kidney corresponding to a CT abnormality. Advise dedicated contrast-enhanced renal CT or MR

## 2021-05-06 NOTE — PROGRESS NOTE ADULT - SUBJECTIVE AND OBJECTIVE BOX
pt seen  no pain  hungry  ICU Vital Signs Last 24 Hrs  T(C): 36.9 (06 May 2021 05:05), Max: 37.4 (05 May 2021 17:06)  T(F): 98.5 (06 May 2021 05:05), Max: 99.4 (05 May 2021 17:06)  HR: 80 (06 May 2021 05:05) (73 - 80)  BP: 145/71 (06 May 2021 05:05) (145/71 - 146/71)  BP(mean): --  ABP: --  ABP(mean): --  RR: 18 (06 May 2021 05:05) (18 - 18)  SpO2: 90% (06 May 2021 05:05) (90% - 94%)  gen-NAD  resp-clear  abd-soft Nt/ND  drain-serous fluid                          10.0   9.63  )-----------( 225      ( 06 May 2021 06:43 )             29.7   05-06    140  |  107  |  19  ----------------------------<  157<H>  4.4   |  24  |  1.20    Ca    8.2<L>      06 May 2021 06:43    TPro  7.6  /  Alb  2.2<L>  /  TBili  0.8  /  DBili  x   /  AST  40<H>  /  ALT  29  /  AlkPhos  142<H>  05-05

## 2021-05-06 NOTE — PROGRESS NOTE ADULT - SUBJECTIVE AND OBJECTIVE BOX
[facility] -- Hugo Dee, Armand Solomon, Lobito Funes [place], [place]  Office # [phone]    Follow Up:  Hx of CAD s/p CABG, CHB s/p PPM, Preop/Postop Optimization    Subjective/Observations: Sitting up at edge of bed.  NC in use but denies SOB, Arreola or orthopnea.  Denies N/V/abdominal pain.  Denies any chest discomfort     REVIEW OF SYSTEMS: All other review of systems is negative unless indicated above  PAST MEDICAL & SURGICAL HISTORY:  HTN (hypertension)    Diabetes    CAD (coronary artery disease)    Cardiac pacemaker    S/P CABG (coronary artery bypass graft)    MEDICATIONS  (STANDING):  aspirin enteric coated 81 milliGRAM(s) Oral daily  atorvastatin 80 milliGRAM(s) Oral at bedtime  dextrose 40% Gel 15 Gram(s) Oral once  dextrose 5%. 1000 milliLiter(s) (50 mL/Hr) IV Continuous <Continuous>  dextrose 5%. 1000 milliLiter(s) (100 mL/Hr) IV Continuous <Continuous>  dextrose 50% Injectable 25 Gram(s) IV Push once  dextrose 50% Injectable 12.5 Gram(s) IV Push once  dextrose 50% Injectable 25 Gram(s) IV Push once  glucagon  Injectable 1 milliGRAM(s) IntraMuscular once  heparin   Injectable 5000 Unit(s) SubCutaneous every 12 hours  insulin lispro (ADMELOG) corrective regimen sliding scale   SubCutaneous three times a day before meals  metoprolol succinate ER 50 milliGRAM(s) Oral daily  piperacillin/tazobactam IVPB.. 3.375 Gram(s) IV Intermittent every 8 hours  sodium chloride 0.9%. 1000 milliLiter(s) (60 mL/Hr) IV Continuous <Continuous>    MEDICATIONS  (PRN):  acetaminophen   Tablet .. 650 milliGRAM(s) Oral every 6 hours PRN Temp greater or equal to 38C (100.4F)  morphine  - Injectable 2 milliGRAM(s) IV Push every 6 hours PRN Severe Pain (7 - 10)  oxycodone    5 mG/acetaminophen 325 mG 1 Tablet(s) Oral every 4 hours PRN Mild Pain (1 - 3)  oxycodone    5 mG/acetaminophen 325 mG 2 Tablet(s) Oral every 6 hours PRN Moderate Pain (4 - 6)    Allergies    No Known Allergies    Intolerances    Vital Signs Last 24 Hrs  T(C): 36.9 (06 May 2021 05:05), Max: 37.4 (05 May 2021 17:06)  T(F): 98.5 (06 May 2021 05:05), Max: 99.4 (05 May 2021 17:06)  HR: 80 (06 May 2021 05:05) (79 - 80)  BP: 145/71 (06 May 2021 05:05) (145/71 - 145/72)  BP(mean): --  RR: 18 (06 May 2021 05:05) (18 - 18)  SpO2: 90% (06 May 2021 05:05) (90% - 92%)  I&O's Summary    05 May 2021 07:01  -  06 May 2021 07:00  --------------------------------------------------------  IN: 1060 mL / OUT: 1665 mL / NET: -605 mL      PHYSICAL EXAM:  TELE: NSR  Constitutional: NAD, awake and alert, obese  HEENT: Moist Mucous Membranes, Anicteric  Pulmonary: Non-labored, breath sounds are clear but diminished bilaterally, No wheezing, rales or rhonchi  Cardiovascular: Regular, S1 and S2, No murmurs, rubs, gallops or clicks  Gastrointestinal: Bowel Sounds present, soft, nontender.  Raven tube intact  Lymph: No peripheral edema. No lymphadenopathy.  Skin: No visible rashes or ulcers.   +chronic changes on BLE  Psych:  Mood & affect appropriate  LABS: All Labs Reviewed:                        10.0   9.63  )-----------( 225      ( 06 May 2021 06:43 )             29.7                         10.3   14.56 )-----------( 219      ( 05 May 2021 07:09 )             30.9                         10.8   17.02 )-----------( 205      ( 04 May 2021 05:14 )             31.6     06 May 2021 06:43    140    |  107    |  19     ----------------------------<  157    4.4     |  24     |  1.20   05 May 2021 07:09    144    |  108    |  26     ----------------------------<  153    3.4     |  26     |  1.50   04 May 2021 05:14    141    |  104    |  30     ----------------------------<  240    3.8     |  26     |  1.90     Ca    8.2        06 May 2021 06:43  Ca    8.5        05 May 2021 07:09  Ca    7.7        04 May 2021 05:14    TPro  7.6    /  Alb  2.2    /  TBili  0.8    /  DBili  x      /  AST  40     /  ALT  29     /  AlkPhos  142    05 May 2021 07:09  TPro  6.7    /  Alb  2.5    /  TBili  1.1    /  DBili  .40    /  AST  26     /  ALT  19     /  AlkPhos  128    04 May 2021 05:14  TPro  8.3    /  Alb  2.7    /  TBili  1.2    /  DBili  x      /  AST  30     /  ALT  23     /  AlkPhos  130    03 May 2021 14:06    CARDIAC MARKERS ( 04 May 2021 12:07 )  <.015 ng/mL / x     / 44 U/L / x     / x           EXAM:  ECHO TTE WO CON COMP W DOPP         PROCEDURE DATE:  05/04/2021        INTERPRETATION:  INDICATION: CAD  Sonographer KL    Blood Pressure 132/70    Height 170 cm     Weight 102 kg       BSA 2.13 sq m    Dimensions:  LA 3.7       Normal Values: 2.0 - 4.0 cm  Ao 2.8        Normal Values: 2.0 - 3.8 cm  SEPTUM 0.9       Normal Values: 0.6 - 1.2 cm  PWT 1.0       Normal Values: 0.6 - 1.1 cm  LVIDd 4.7         Normal Values: 3.0 - 5.6 cm  LVIDs 3.7         Normal Values: 1.8 - 4.0 cm      OBSERVATIONS:  Mitral Valve: Mild MR.  Aortic Valve/Aorta: Sclerotic trileaflet aortic valve with grossly normal opening.  Tricuspid Valve: Mild TR.  Pulmonic Valve: Not well-visualized  Left Atrium: normal  Right Atrium: Not well-visualized  Left Ventricle: normal LV size and systolic function, estimated LVEF of 55%.  Right Ventricle: Grossly normal size and systolic function. A device wire is noted within the right heart  Pericardium: no significant pericardial effusion.  Pulmonary/RV Pressure: estimated PA systolic pressure of 48 mmHg  LV diastolic dysfunction is present        IMPRESSION:  Normal left ventricular internal dimensions and systolic function, estimated LVEF of 55%.  Grossly normal RV size and systolic function. A device wire is noted within the right heart  Sclerotic trileaflet aortic valve, without AI.  Mild MR and TR.  No significant pericardial effusion.      FANG INGRAM MD; Attending Cardiologist  This document has been electronically signed. May  5 2021  9:05AM        EXAM:  XR CHEST PORTABLE URGENT 1V                            PROCEDURE DATE:  05/03/2021          INTERPRETATION:  AP chest on May 3, 2021 at 12:43 PM. Patient has chest pain.    COMPARISON: None available.    Heart size is within normal limits. Sternotomy and left-sided pacemaker are noted.    There are few slight linear densities in the right mid lower lung field consistent with slight scar or atelectasis.    IMPRESSION: Slight linear densities right chest. Sternotomy and pacemaker.    DAVID BUSH M.D., ATTENDING RADIOLOGIST  This document has been electronically signed. May  3 2021  1:39PM      Ventricular Rate 82 BPM    Atrial Rate 82 BPM    P-R Interval 152 ms    QRS Duration 92 ms    Q-T Interval 354 ms    QTC Calculation(Bazett) 413 ms    P Axis 59 degrees    R Axis 6 degrees    T Axis 62 degrees    Diagnosis Line Normal sinus rhythm  Nonspecific T wave abnormality  Abnormal ECG    Confirmed by david Belle ([id number]) on 5/4/2021 3:44:47 PM

## 2021-05-07 ENCOUNTER — TRANSCRIPTION ENCOUNTER (OUTPATIENT)
Age: 81
End: 2021-05-07

## 2021-05-07 VITALS
OXYGEN SATURATION: 95 % | SYSTOLIC BLOOD PRESSURE: 146 MMHG | HEART RATE: 80 BPM | TEMPERATURE: 98 F | DIASTOLIC BLOOD PRESSURE: 76 MMHG | RESPIRATION RATE: 19 BRPM

## 2021-05-07 LAB
ANION GAP SERPL CALC-SCNC: 10 MMOL/L — SIGNIFICANT CHANGE UP (ref 5–17)
BUN SERPL-MCNC: 15 MG/DL — SIGNIFICANT CHANGE UP (ref 7–23)
CALCIUM SERPL-MCNC: 8.8 MG/DL — SIGNIFICANT CHANGE UP (ref 8.5–10.1)
CHLORIDE SERPL-SCNC: 106 MMOL/L — SIGNIFICANT CHANGE UP (ref 96–108)
CO2 SERPL-SCNC: 23 MMOL/L — SIGNIFICANT CHANGE UP (ref 22–31)
CREAT SERPL-MCNC: 1 MG/DL — SIGNIFICANT CHANGE UP (ref 0.5–1.3)
GLUCOSE SERPL-MCNC: 182 MG/DL — HIGH (ref 70–99)
HCT VFR BLD CALC: 32.1 % — LOW (ref 39–50)
HGB BLD-MCNC: 10.8 G/DL — LOW (ref 13–17)
MCHC RBC-ENTMCNC: 29.9 PG — SIGNIFICANT CHANGE UP (ref 27–34)
MCHC RBC-ENTMCNC: 33.6 GM/DL — SIGNIFICANT CHANGE UP (ref 32–36)
MCV RBC AUTO: 88.9 FL — SIGNIFICANT CHANGE UP (ref 80–100)
NRBC # BLD: 0 /100 WBCS — SIGNIFICANT CHANGE UP (ref 0–0)
PLATELET # BLD AUTO: 233 K/UL — SIGNIFICANT CHANGE UP (ref 150–400)
POTASSIUM SERPL-MCNC: 3.8 MMOL/L — SIGNIFICANT CHANGE UP (ref 3.5–5.3)
POTASSIUM SERPL-SCNC: 3.8 MMOL/L — SIGNIFICANT CHANGE UP (ref 3.5–5.3)
RBC # BLD: 3.61 M/UL — LOW (ref 4.2–5.8)
RBC # FLD: 15.4 % — HIGH (ref 10.3–14.5)
SODIUM SERPL-SCNC: 139 MMOL/L — SIGNIFICANT CHANGE UP (ref 135–145)
WBC # BLD: 7.46 K/UL — SIGNIFICANT CHANGE UP (ref 3.8–10.5)
WBC # FLD AUTO: 7.46 K/UL — SIGNIFICANT CHANGE UP (ref 3.8–10.5)

## 2021-05-07 PROCEDURE — 82553 CREATINE MB FRACTION: CPT

## 2021-05-07 PROCEDURE — 87205 SMEAR GRAM STAIN: CPT

## 2021-05-07 PROCEDURE — 99232 SBSQ HOSP IP/OBS MODERATE 35: CPT

## 2021-05-07 PROCEDURE — 99285 EMERGENCY DEPT VISIT HI MDM: CPT | Mod: 25

## 2021-05-07 PROCEDURE — 93005 ELECTROCARDIOGRAM TRACING: CPT

## 2021-05-07 PROCEDURE — 83605 ASSAY OF LACTIC ACID: CPT

## 2021-05-07 PROCEDURE — 87186 SC STD MICRODIL/AGAR DIL: CPT

## 2021-05-07 PROCEDURE — 86850 RBC ANTIBODY SCREEN: CPT

## 2021-05-07 PROCEDURE — 81001 URINALYSIS AUTO W/SCOPE: CPT

## 2021-05-07 PROCEDURE — 82550 ASSAY OF CK (CPK): CPT

## 2021-05-07 PROCEDURE — 87040 BLOOD CULTURE FOR BACTERIA: CPT

## 2021-05-07 PROCEDURE — 47490 INCISION OF GALLBLADDER: CPT

## 2021-05-07 PROCEDURE — 85730 THROMBOPLASTIN TIME PARTIAL: CPT

## 2021-05-07 PROCEDURE — 71045 X-RAY EXAM CHEST 1 VIEW: CPT

## 2021-05-07 PROCEDURE — 86901 BLOOD TYPING SEROLOGIC RH(D): CPT

## 2021-05-07 PROCEDURE — 80053 COMPREHEN METABOLIC PANEL: CPT

## 2021-05-07 PROCEDURE — 76942 ECHO GUIDE FOR BIOPSY: CPT

## 2021-05-07 PROCEDURE — 87086 URINE CULTURE/COLONY COUNT: CPT

## 2021-05-07 PROCEDURE — 83036 HEMOGLOBIN GLYCOSYLATED A1C: CPT

## 2021-05-07 PROCEDURE — 86769 SARS-COV-2 COVID-19 ANTIBODY: CPT

## 2021-05-07 PROCEDURE — C1729: CPT

## 2021-05-07 PROCEDURE — 74177 CT ABD & PELVIS W/CONTRAST: CPT

## 2021-05-07 PROCEDURE — 74176 CT ABD & PELVIS W/O CONTRAST: CPT

## 2021-05-07 PROCEDURE — 83690 ASSAY OF LIPASE: CPT

## 2021-05-07 PROCEDURE — 93306 TTE W/DOPPLER COMPLETE: CPT

## 2021-05-07 PROCEDURE — C1769: CPT

## 2021-05-07 PROCEDURE — 97162 PT EVAL MOD COMPLEX 30 MIN: CPT

## 2021-05-07 PROCEDURE — 96365 THER/PROPH/DIAG IV INF INIT: CPT

## 2021-05-07 PROCEDURE — A9537: CPT

## 2021-05-07 PROCEDURE — 87077 CULTURE AEROBIC IDENTIFY: CPT

## 2021-05-07 PROCEDURE — 80048 BASIC METABOLIC PNL TOTAL CA: CPT

## 2021-05-07 PROCEDURE — U0003: CPT

## 2021-05-07 PROCEDURE — 36415 COLL VENOUS BLD VENIPUNCTURE: CPT

## 2021-05-07 PROCEDURE — 85027 COMPLETE CBC AUTOMATED: CPT

## 2021-05-07 PROCEDURE — 86900 BLOOD TYPING SEROLOGIC ABO: CPT

## 2021-05-07 PROCEDURE — 80076 HEPATIC FUNCTION PANEL: CPT

## 2021-05-07 PROCEDURE — 84484 ASSAY OF TROPONIN QUANT: CPT

## 2021-05-07 PROCEDURE — 85025 COMPLETE CBC W/AUTO DIFF WBC: CPT

## 2021-05-07 PROCEDURE — 87075 CULTR BACTERIA EXCEPT BLOOD: CPT

## 2021-05-07 PROCEDURE — 85610 PROTHROMBIN TIME: CPT

## 2021-05-07 PROCEDURE — 76705 ECHO EXAM OF ABDOMEN: CPT

## 2021-05-07 PROCEDURE — 87070 CULTURE OTHR SPECIMN AEROBIC: CPT

## 2021-05-07 PROCEDURE — 76775 US EXAM ABDO BACK WALL LIM: CPT

## 2021-05-07 PROCEDURE — 76000 FLUOROSCOPY <1 HR PHYS/QHP: CPT

## 2021-05-07 PROCEDURE — U0005: CPT

## 2021-05-07 PROCEDURE — 74177 CT ABD & PELVIS W/CONTRAST: CPT | Mod: 26

## 2021-05-07 PROCEDURE — 82962 GLUCOSE BLOOD TEST: CPT

## 2021-05-07 PROCEDURE — 78226 HEPATOBILIARY SYSTEM IMAGING: CPT

## 2021-05-07 RX ORDER — ALBUTEROL 90 UG/1
2 AEROSOL, METERED ORAL
Qty: 0 | Refills: 0 | DISCHARGE

## 2021-05-07 RX ORDER — METRONIDAZOLE 500 MG
1 TABLET ORAL
Qty: 30 | Refills: 0
Start: 2021-05-07 | End: 2021-05-16

## 2021-05-07 RX ORDER — METRONIDAZOLE 500 MG
TABLET ORAL
Refills: 0 | Status: DISCONTINUED | OUTPATIENT
Start: 2021-05-07 | End: 2021-05-07

## 2021-05-07 RX ORDER — CEFPODOXIME PROXETIL 100 MG
100 TABLET ORAL EVERY 12 HOURS
Refills: 0 | Status: DISCONTINUED | OUTPATIENT
Start: 2021-05-07 | End: 2021-05-07

## 2021-05-07 RX ORDER — METRONIDAZOLE 500 MG
500 TABLET ORAL EVERY 8 HOURS
Refills: 0 | Status: DISCONTINUED | OUTPATIENT
Start: 2021-05-07 | End: 2021-05-07

## 2021-05-07 RX ORDER — CEFPODOXIME PROXETIL 100 MG
1 TABLET ORAL
Qty: 20 | Refills: 0
Start: 2021-05-07 | End: 2021-05-16

## 2021-05-07 RX ORDER — METFORMIN HYDROCHLORIDE 850 MG/1
1 TABLET ORAL
Qty: 0 | Refills: 0 | DISCHARGE

## 2021-05-07 RX ORDER — METRONIDAZOLE 500 MG
500 TABLET ORAL ONCE
Refills: 0 | Status: DISCONTINUED | OUTPATIENT
Start: 2021-05-07 | End: 2021-05-07

## 2021-05-07 RX ORDER — LOSARTAN POTASSIUM 100 MG/1
50 TABLET, FILM COATED ORAL DAILY
Refills: 0 | Status: DISCONTINUED | OUTPATIENT
Start: 2021-05-07 | End: 2021-05-07

## 2021-05-07 RX ORDER — ACETAMINOPHEN 500 MG
2 TABLET ORAL
Qty: 0 | Refills: 0 | DISCHARGE
Start: 2021-05-07

## 2021-05-07 RX ADMIN — Medication 1: at 08:37

## 2021-05-07 RX ADMIN — HEPARIN SODIUM 5000 UNIT(S): 5000 INJECTION INTRAVENOUS; SUBCUTANEOUS at 17:29

## 2021-05-07 RX ADMIN — Medication 100 MILLIGRAM(S): at 17:32

## 2021-05-07 RX ADMIN — Medication 81 MILLIGRAM(S): at 12:27

## 2021-05-07 RX ADMIN — Medication 2: at 12:27

## 2021-05-07 RX ADMIN — Medication 2: at 17:29

## 2021-05-07 RX ADMIN — Medication 500 MILLIGRAM(S): at 17:28

## 2021-05-07 RX ADMIN — Medication 50 MILLIGRAM(S): at 06:20

## 2021-05-07 RX ADMIN — HEPARIN SODIUM 5000 UNIT(S): 5000 INJECTION INTRAVENOUS; SUBCUTANEOUS at 06:20

## 2021-05-07 NOTE — PROGRESS NOTE ADULT - PROBLEM SELECTOR PLAN 5
stable  continue meds as tolerated

## 2021-05-07 NOTE — PROGRESS NOTE ADULT - SUBJECTIVE AND OBJECTIVE BOX
INTERVAL Hx:  79 y/o male s/p cholecystotomy tube placement, with incidental finding of 2cm right renal mass on non-contrast CT.  Ultrasound uninformative.  CT with contrast ordered, still pending.  ANITA resolved.    MEDICATIONS  (STANDING):  aspirin enteric coated 81 milliGRAM(s) Oral daily  atorvastatin 80 milliGRAM(s) Oral at bedtime  dextrose 40% Gel 15 Gram(s) Oral once  dextrose 5%. 1000 milliLiter(s) (50 mL/Hr) IV Continuous <Continuous>  dextrose 5%. 1000 milliLiter(s) (100 mL/Hr) IV Continuous <Continuous>  dextrose 50% Injectable 25 Gram(s) IV Push once  dextrose 50% Injectable 12.5 Gram(s) IV Push once  dextrose 50% Injectable 25 Gram(s) IV Push once  glucagon  Injectable 1 milliGRAM(s) IntraMuscular once  heparin   Injectable 5000 Unit(s) SubCutaneous every 12 hours  insulin lispro (ADMELOG) corrective regimen sliding scale   SubCutaneous three times a day before meals  metoprolol succinate ER 50 milliGRAM(s) Oral daily  piperacillin/tazobactam IVPB.. 3.375 Gram(s) IV Intermittent every 8 hours  sodium chloride 0.9%. 1000 milliLiter(s) (60 mL/Hr) IV Continuous <Continuous>    MEDICATIONS  (PRN):  acetaminophen   Tablet .. 650 milliGRAM(s) Oral every 6 hours PRN Temp greater or equal to 38C (100.4F)  morphine  - Injectable 2 milliGRAM(s) IV Push every 6 hours PRN Severe Pain (7 - 10)  oxycodone    5 mG/acetaminophen 325 mG 1 Tablet(s) Oral every 4 hours PRN Mild Pain (1 - 3)  oxycodone    5 mG/acetaminophen 325 mG 2 Tablet(s) Oral every 6 hours PRN Moderate Pain (4 - 6)        Vital Signs Last 24 Hrs  T(C): 36.7 (07 May 2021 05:33), Max: 37 (06 May 2021 16:46)  T(F): 98.1 (07 May 2021 05:33), Max: 98.6 (06 May 2021 16:46)  HR: 73 (07 May 2021 05:33) (73 - 77)  BP: 158/81 (07 May 2021 05:33) (153/72 - 170/67)  BP(mean): --  RR: 18 (07 May 2021 05:33) (18 - 19)  SpO2: 97% (07 May 2021 05:33) (93% - 97%)    PHYSICAL EXAM:    ABDOMEN:    :    Blank:     LABS:                        10.8   7.46  )-----------( 233      ( 07 May 2021 07:09 )             32.1     05-07    139  |  106  |  15  ----------------------------<  182<H>  3.8   |  23  |  1.00    Ca    8.8      07 May 2021 07:09      Urine culture:  05-05 @ 16:26 --   No growth  Urine culture:  05-03 @ 21:21 --   >100,000 CFU/ml Enterobacter cloacae complex  Urine culture:  05-03 @ 21:09 --   No growth to date.

## 2021-05-07 NOTE — PHARMACOTHERAPY INTERVENTION NOTE - COMMENTS
Patient on Zosyn Q8H for intra-abdominal infection. Reached out to MD (Dr. BARBER Benjamin) to recommend transitioning patient to oral antibiotics. MD agreed. Patient will be started on Vantin 100 mg Q12h and flagyl 500 mg Q8h.

## 2021-05-07 NOTE — DISCHARGE NOTE PROVIDER - CARE PROVIDERS DIRECT ADDRESSES
,DirectAddress_Unknown,[email or web address]

## 2021-05-07 NOTE — PROGRESS NOTE ADULT - REASON FOR ADMISSION
right sided abd pain since friday with ekg changes in pcp office

## 2021-05-07 NOTE — PROGRESS NOTE ADULT - PROBLEM SELECTOR PLAN 3
s/p ppm interogation in er  continue cardiac meds at tolerated

## 2021-05-07 NOTE — PROGRESS NOTE ADULT - SUBJECTIVE AND OBJECTIVE BOX
pt seen  feeling good  no complaints  ICU Vital Signs Last 24 Hrs  T(C): 36.7 (07 May 2021 05:33), Max: 37 (06 May 2021 16:46)  T(F): 98.1 (07 May 2021 05:33), Max: 98.6 (06 May 2021 16:46)  HR: 73 (07 May 2021 05:33) (73 - 77)  BP: 158/81 (07 May 2021 05:33) (153/72 - 170/67)  BP(mean): --  ABP: --  ABP(mean): --  RR: 18 (07 May 2021 05:33) (18 - 19)  SpO2: 97% (07 May 2021 05:33) (93% - 97%)  gen-NAD  resp-clear  abd-soft NT/ND  drain, minimal drainage    I&O's Detail    06 May 2021 07:01  -  07 May 2021 07:00  --------------------------------------------------------  IN:    IV PiggyBack: 100 mL    Oral Fluid: 300 mL  Total IN: 400 mL    OUT:    Drain (mL): 115 mL    Voided (mL): 900 mL  Total OUT: 1015 mL    Total NET: -615 mL

## 2021-05-07 NOTE — PROGRESS NOTE ADULT - PROBLEM SELECTOR PLAN 1
iv abx  s/p percutanous drain - doing well  sx fu noted  likely dc today or oral abx  fu with sx as outpt iv abx  sepsis ruled in and poa due to actue cholecystitis  s/p percutanous drain - doing well  sx fu noted  likely dc today or oral abx  fu with sx as outpt

## 2021-05-07 NOTE — PROGRESS NOTE ADULT - ASSESSMENT
79 yo s/p perc nydia for cholecystis     cleared for d.c after CT on 10 day total vantin and flagyl
ANITA: Prerenal azotemia, on ARB  Acute cholecystitis, s/p Percutaneous Cholecysostomy drain placement  ? Rt renal lesion  Diabetes  Hypertension    Improved renal indices. To continue current meds. Can resume ARB at lower dose and uptitrate as out pt. Surgery follow up. Monitor blood sugar levels. Insulin coverage as needed. Awaiting CT scan.    Monitor BP trend. Titrate BP meds as needed. Salt restriction. Avoid nephrotoxic meds as possible. Avoid ACEI, ARB, NSAIDs and IV contrast.   Will follow electrolytes and renal function trend.  follow up. D/c planning. 
ANITA: Prerenal azotemia, on ARB  Acute cholecystitis, s/p Percutaneous Cholecysostomy drain placement  ? Rt renal lesion  Diabetes  Hypertension    Improving renal indices. To continue current meds. Hold ARB. Surgery follow up. Monitor blood sugar levels. Insulin coverage as needed. For CT abd today to evaluate kidney lesion.   Monitor BP trend. Titrate BP meds as needed. Salt restriction. Avoid nephrotoxic meds as possible. Avoid ACEI, ARB, NSAIDs and IV contrast. Will follow electrolytes and renal function trend.    follow up. 
Indeterminate right renal mass  for CT abdn w/ and w/o IVC.  Discussed with nephrology and hospitalist.
right renal mass  await contrast CT results
79 yo with extensive cardiac history with >3 day hx of abdominal pain. Likely cholecystitis.  Due to prolonged symptoms and cardiac history decision for cholecystostomy tube for treatment of cholecystitis.  PT high risk for surgical complications.  Will get HIDA to confirm cholecystitis      IR for cholecystostomy tube
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     CAD s/p CABG x 4  - Patient has a significant cardiac history, now found to have dynamic changes on EKG (1 mm in V3, V4-5). When compared to prior EKG from his cardiologist Dr. Thurston's office in February of 2021, he had very mild T wave inversions in high lateral leads   - He is not complaining of active chest pain at this time, however he does endorse some worsening of his QUIROS since February of this year. This may be secondary to COPD/body habitus, however cannot rule out cardiac cause.   - He had a myocardial perfusion study in 11/2019 which showed so significant ischemia.  No stress test or cath since that time.   - Troponin  x  4 negative.   Dynamic changes on EKG may be secondary to underlying infectious cause/hypotension, however, cannot effectively rule out ischemia without further workup.   - Last TTE done at Dr Thurston's office in June of 2020 shows EF of 55% and no significant valvular disease. Given dynamic EKG changes and worsening QUIROS  - repeat echo showed normal LV & RV size and function EF 55%, mild MR and TR  - Would continue his home ASA, BB and statin   - Would hold home ARB in the setting of ANITA    Acute nydia  - No evidence of volume overload. He is on Lasix at home for chronic LE edema, if blood pressures allow and kidney function continue to improve, may resume.   - Due to prolonged symptoms and cardiac history decision for cholecystostomy tube for treatment of cholecystitis. Patient s/p IR drain    CHB s/p PPM  - NSR on tele  - He has a [employer] PPM.  Interrogation 5/3 revealed 5 years left of javier life.  Not pacer dependent.  Since 8/12/2019 to present, PPM was 5% Vpaced     Hypertension  - BP: 146/71 (05-05-21 @ 11:17) (113/57 - 146/71)  - Initially, hypotensive.  Now BP is stable   - Can continue BB.  Hold ARB    ANITA  - Presented with creatinine of 2.5, likely, in the setting of dehydration  - Creatinine trend:  <--1.50,  <--1.90,  <--2.50  - Okay with IV hydration  - Avoid nephrotoxics  - Renal following    DVT ppx  - Per Primary    - Monitor and replete lytes, keep K>4, Mg>2.  - All other medical needs as per primary team.  - Other cardiovascular workup will depend on clinical course.  - Will continue to follow.    Sherin Sethi, MS FNP, [facility]  Nurse Practitioner- Cardiology   Spectra #[phone]
81 yo male here for acute cholecystitis s./p Percutaneous cholecystostomy drain
ANITA: Prerenal azotemia, on ARB  Acute cholecystitis, s/p Percutaneous Cholecysostomy drain placement  ? Rt renal lesion  Diabetes  Hypertension    Improving renal indices. To continue current meds. Hold ARB. Surgery follow up. Monitor blood sugar levels. Insulin coverage as needed.   Monitor BP trend. Titrate BP meds as needed. Salt restriction. Avoid nephrotoxic meds as possible. Avoid ACEI, ARB, NSAIDs and IV contrast. Will follow electrolytes and renal function trend. D/w patient regarding need for out patient nephrology follow up.  evaluation for Rt renal lesion.
s/p cholecystostomy tube      reg diet      if doing well, may be d/c richar with drain      needs renal mass workup 
 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     CAD s/p CABG x 4  - Patient has a significant cardiac history, now found to have dynamic changes on EKG (1 mm in V3, V4-5). When compared to prior EKG from his cardiologist Dr. Thurston's office in February of 2021, he had very mild T wave inversions in high lateral leads   - He is not complaining of active chest pain at this time, however he does endorse some worsening of his QUIROS since February of this year. This may be secondary to COPD/body habitus, however cannot rule out cardiac cause.   - He had a myocardial perfusion study in 11/2019 which showed so significant ischemia.  No tress test or cath since that time.   - Troponin  x  4 negative.   Dynamic changes on EKG may be secondary to underlying infectious cause/hypotension, however, cannot effectively rule out ischemia without further workup.   - Last TTE done at Dr Thurston's office in June of 2020 shows EF of 55% and no significant valvular disease. Given dynamic EKG changes and worsening QUIROS, can do routine TTE   - Would continue his home ASA, BB and statin   - Would hold home ARB in the setting of ANITA    Acute nydia  - No evidence of volume overload. He is on Lasix at home for chronic LE edema, if blood pressures allow and kidney function continue to improve, may resume.   - He has a [employer] PPM.  Interrogation 5/3 revealed 5 years left of javier life.  Not pacer dependent.  Since 8/12/2019 to present, PPM was 5% Vpaced   - Patient is planned for surgical intervention for acute abdominal pathology.  At this point, he is optimized from cardiac standpoint.  Recommend close hemodynamic monitoring postop given EKG changes  - Repeat TTE would not preclude surgical intervention    CHB s/p PPM  - NSR on tele  - PPM interrogation as above.  - Monitor and replete lytes, keep K>4, Mg>2.    Hypertension  - Initially, hypotensive.  Now BP is stable at systolic 110-130's  - Can continue BB.  Hold ARB    ANITA  - Presented with creatinine of 2.5, likely, in the setting of dehydration  - Improved to 1.9.  Okay with IV hydration  - Avoid nephrotoxics  - Renal following    DVT ppx  - Per Primary    - Other cardiovascular workup will depend on clinical course. All other workup per primary team.  - Will follow.    Grace Amador DNP, NP-C  Cardiology   Spectra #[phone]  
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     CAD s/p CABG x 4  - Patient has a significant cardiac history, now found to have dynamic changes on EKG (1 mm in V3, V4-5). When compared to prior EKG from his cardiologist Dr. Thurston's office in February of 2021, he had very mild T wave inversions in high lateral leads   - +worsening of his QUIROS since February of this year. This may be secondary to COPD/body habitus, however cannot rule out cardiac etiology.   - He had a myocardial perfusion study in 11/2019 which showed so significant ischemia.  No stress test or cath since that time.   - Troponin  x  4 negative.   Dynamic changes on EKG may be secondary to underlying infectious cause/hypotension, however, cannot effectively rule out ischemia without further workup.  He will need an outpatient follow up for ischemic eval  - Last TTE done at Dr Thurston's office in June of 2020 shows EF of 55% and no significant valvular disease. Given dynamic EKG changes and worsening QUIROS  - Repeat echo showed normal LV & RV size and function EF 55%, mild MR and TR  - Continue his home ASA, BB and statin   - If renal function remains normal, would resume home ARB    Acute nydia  - No evidence of volume overload. He is on Lasix at home for chronic LE edema.  Would resume.   - Patient s/p IR drain/nydia tube    CHB s/p PPM  - NSR on tele.  Can discontinue  - He has a [employer] PPM.  Interrogation 5/3 revealed 5 years left of javier life.  Not pacer dependent.  Since 8/12/2019 to present, PPM was 5% Vpaced     Hypertension  - BP stable at systolic 140's  - Can continue BB.  Would resume ARB in am if renal function remains normal.  Would resume Lasix now    ANITA  - Presented with creatinine of 2.5, likely, in the setting of dehydration  - Resolved  - Renal following    DVT ppx  - Per Primary    - Monitor and replete lytes, keep K>4, Mg>2.    - All other medical needs as per primary team.  - Other cardiovascular workup will depend on clinical course.  - Will continue to follow.    Grace Amador DNP, NP-C  Cardiology   Spectra #[phone]    
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     CAD s/p CABG x 4  - Patient has a significant cardiac history, now found to have dynamic changes on EKG (1 mm in V3, V4-5). When compared to prior EKG from his cardiologist Dr. Thurston's office in February of 2021, he had very mild T wave inversions in high lateral leads. He had a myocardial perfusion study in 11/2019 which showed so significant ischemia.  No stress test or cath since that time.   - +worsening of his QUIROS since February of this year. This may be secondary to COPD/body habitus, however cannot rule out cardiac etiology.   - - Troponin  x  4 negative. Dynamic changes on EKG may be secondary to underlying infectious cause/hypotension, however, cannot effectively rule out ischemia without further workup.  He will need an outpatient follow up for ischemic eval  - Last TTE done at Dr Thurston's office in June of 2020 shows EF of 55% and no significant valvular disease. Repeat echo showed normal LV & RV size and function EF 55%, mild MR and TR  - Continue his home ASA, BB and statin   - If renal function remains normal, would resume home ARB    Acute nydia  - Patient s/p IR drain/nydia tube    CHB s/p PPM  - He has a [employer] PPM.  Interrogation 5/3 revealed 5 years left of javier life.  Not pacer dependent.  Since 8/12/2019 to present, PPM was 5% Vpaced     Hypertension  - BP: 158/81 (05-07-21 @ 05:33) (153/72 - 170/67)  - Can continue BB.  Would resume ARB in am if renal function remains normal.   - Would resume Lasix now    ANITA  - Resolved  - right renal mass. Awaiting CT  - No evidence of volume overload. He is on Lasix at home for chronic LE edema.    - Resume home dose of Lasix     DVT ppx  - Per Primary    - Monitor and replete lytes, keep K>4, Mg>2.  - All other medical needs as per primary team.  - Other cardiovascular workup will depend on clinical course.  - Will continue to follow.    Sherin Sethi MS FNP, [facility]  Nurse Practitioner- Cardiology   Spectra #[phone]
cholecystitis      for IR drainage     may start diet after

## 2021-05-07 NOTE — CHART NOTE - NSCHARTNOTEFT_GEN_A_CORE
ct renal contrast noted  with renal mass - discussed with gu on case  ok for dc and outpt fu in office  no inhouse intervention at present  discussed with pt  to fu with gu and sx as outpt

## 2021-05-07 NOTE — DISCHARGE NOTE PROVIDER - PROVIDER TOKENS
PROVIDER:[TOKEN:[[id number]],FOLLOWUP:[1 week]],PROVIDER:[TOKEN:[[id number]],FOLLOWUP:[1 week]]

## 2021-05-07 NOTE — DISCHARGE NOTE NURSING/CASE MANAGEMENT/SOCIAL WORK - PATIENT PORTAL LINK FT
You can access the FollowMyHealth Patient Portal offered by [facility] by registering at the following website: [email or web address]. By joining [employer]’s FollowMyHealth portal, you will also be able to view your health information using other applications (apps) compatible with our system.

## 2021-05-07 NOTE — PROGRESS NOTE ADULT - SUBJECTIVE AND OBJECTIVE BOX
[facility] -- Hugo Dee Grossman, [place], Lobito Funes, Cherelle Belle: Office # [phone]    Follow Up: Hx of CAD s/p CABG, CHB s/p PPM, Preop/Postop Optimization    Subjective/Observations: Patient seen and examined. Patient awake, alert, resting in bed. No complaints of chest pain, dyspnea, palpitations or dizziness. No signs of orthopnea or PND. Cholecystostomy drain in place,     REVIEW OF SYSTEMS: All review of systems is negative for eye, ENT, GI, , allergic, dermatologic, musculoskeletal and neurologic except as described above    PAST MEDICAL & SURGICAL HISTORY:  HTN (hypertension)    Diabetes    CAD (coronary artery disease)    Cardiac pacemaker    S/P CABG (coronary artery bypass graft)    MEDICATIONS  (STANDING):  aspirin enteric coated 81 milliGRAM(s) Oral daily  atorvastatin 80 milliGRAM(s) Oral at bedtime  dextrose 40% Gel 15 Gram(s) Oral once  dextrose 5%. 1000 milliLiter(s) (50 mL/Hr) IV Continuous <Continuous>  dextrose 5%. 1000 milliLiter(s) (100 mL/Hr) IV Continuous <Continuous>  dextrose 50% Injectable 25 Gram(s) IV Push once  dextrose 50% Injectable 12.5 Gram(s) IV Push once  dextrose 50% Injectable 25 Gram(s) IV Push once  glucagon  Injectable 1 milliGRAM(s) IntraMuscular once  heparin   Injectable 5000 Unit(s) SubCutaneous every 12 hours  insulin lispro (ADMELOG) corrective regimen sliding scale   SubCutaneous three times a day before meals  metoprolol succinate ER 50 milliGRAM(s) Oral daily  piperacillin/tazobactam IVPB.. 3.375 Gram(s) IV Intermittent every 8 hours  sodium chloride 0.9%. 1000 milliLiter(s) (60 mL/Hr) IV Continuous <Continuous>    MEDICATIONS  (PRN):  acetaminophen   Tablet .. 650 milliGRAM(s) Oral every 6 hours PRN Temp greater or equal to 38C (100.4F)  morphine  - Injectable 2 milliGRAM(s) IV Push every 6 hours PRN Severe Pain (7 - 10)  oxycodone    5 mG/acetaminophen 325 mG 1 Tablet(s) Oral every 4 hours PRN Mild Pain (1 - 3)  oxycodone    5 mG/acetaminophen 325 mG 2 Tablet(s) Oral every 6 hours PRN Moderate Pain (4 - 6)    Allergies  No Known Allergies    Vital Signs Last 24 Hrs  T(C): 36.7 (07 May 2021 05:33), Max: 37 (06 May 2021 16:46)  T(F): 98.1 (07 May 2021 05:33), Max: 98.6 (06 May 2021 16:46)  HR: 73 (07 May 2021 05:33) (73 - 77)  BP: 158/81 (07 May 2021 05:33) (153/72 - 170/67)  BP(mean): --  RR: 18 (07 May 2021 05:33) (18 - 19)  SpO2: 97% (07 May 2021 05:33) (93% - 97%)  I&O's Summary    06 May 2021 07:01  -  07 May 2021 07:00  --------------------------------------------------------  IN: 400 mL / OUT: 1015 mL / NET: -615 mL       TELE: SR 60-70s   PHYSICAL EXAM:  Appearance: NAD, no distress, alert, Obese   HEENT: Moist Mucous Membranes, Anicteric  Cardiovascular: Regular rate and rhythm, Normal S1 S2, No JVD, No murmurs, No rubs, gallops or clicks  Respiratory: Non-labored, Clear to auscultation, No rales, No rhonchi, No wheezing.   Gastrointestinal:  Soft, Non-tender, + BS, + abdominal drain   Neurologic: Non-focal  Skin: Warm and dry, No visible rashes or ulcers, No ecchymosis, No cyanosis  Musculoskeletal: No clubbing, No cyanosis, No joint swelling/tenderness  Psychiatry: Mood & affect appropriate  Lymph: No peripheral edema chronic venous stasis skin changes.     LABS: All Labs Reviewed:                        10.8   7.46  )-----------( 233      ( 07 May 2021 07:09 )             32.1                         10.0   9.63  )-----------( 225      ( 06 May 2021 06:43 )             29.7                         10.3   14.56 )-----------( 219      ( 05 May 2021 07:09 )             30.9     07 May 2021 07:09    139    |  106    |  15     ----------------------------<  182    3.8     |  23     |  1.00   06 May 2021 06:43    140    |  107    |  19     ----------------------------<  157    4.4     |  24     |  1.20   05 May 2021 07:09    144    |  108    |  26     ----------------------------<  153    3.4     |  26     |  1.50     Ca    8.8        07 May 2021 07:09  Ca    8.2        06 May 2021 06:43  Ca    8.5        05 May 2021 07:09    TPro  7.6    /  Alb  2.2    /  TBili  0.8    /  DBili  x      /  AST  40     /  ALT  29     /  AlkPhos  142    05 May 2021 07:09  Creatine Kinase, Serum: 44 U/L (05-04-21 @ 12:07)  Troponin I, Serum: <.015 ng/mL (05-04-21 @ 12:07)  Creatine Kinase, Serum: 65 U/L (05-04-21 @ 05:14)    12 Lead ECG:   Ventricular Rate 82 BPM  Atrial Rate 82 BPM  P-R Interval 152 m  QRS Duration 92 ms  Q-T Interval 354 ms  QTC Calculation(Bazett) 413 ms  P Axis 59 degrees  R Axis 6 degrees  T Axis 62 degrees  Diagnosis Line Normal sinus rhythm  Nonspecific T wave abnormality  Abnormal ECG  Confirmed by david Belle ([id number]) on 5/4/2021 3:44:47 PM (05-03-21 @ 17:08)    < from: TTE Echo Complete w/o Contrast w/ Doppler (05.04.21 @ 11:16) >  Dimensions:  LA 3.7       Normal Values: 2.0 - 4.0 cm  Ao 2.8        Normal Values: 2.0 - 3.8 cm  SEPTUM 0.9       Normal Values: 0.6 - 1.2 cm  PWT 1.0       Normal Values: 0.6 - 1.1 cm  LVIDd 4.7         Normal Values: 3.0 - 5.6 cm  LVIDs 3.7         Normal Values: 1.8 - 4.0 cm    OBSERVATIONS:  Mitral Valve: Mild MR.  Aortic Valve/Aorta: Sclerotic trileaflet aortic valve with grossly normal opening.  Tricuspid Valve: Mild TR.  Pulmonic Valve: Not well-visualized  Left Atrium: normal  Right Atrium: Not well-visualized  Left Ventricle: normal LV size and systolic function, estimated LVEF of 55%.  Right Ventricle: Grossly normal size and systolic function. A device wire is noted within the right heart  Pericardium: no significant pericardial effusion.  Pulmonary/RV Pressure: estimated PA systolic pressure of 48 mmHg  LV diastolic dysfunction is present    IMPRESSION:  Normal left ventricular internal dimensions and systolic function, estimated LVEF of 55%.  Grossly normal RV size and systolic function. A device wire is noted within the right heart  Sclerotic trileaflet aortic valve, without AI.  Mild MR and TR.  No significant pericardial effusion.    < end of copied text > [facility] -- Hugo Dee Grossman, [place], Lobito Funes, Cherelle Belle: Office # [phone]    Follow Up: Hx of CAD s/p CABG, CHB s/p PPM, Preop/Postop Optimization    Subjective/Observations: Patient seen and examined. Patient awake, alert, resting in bed. No complaints of chest pain, dyspnea, palpitations or dizziness. No signs of orthopnea or PND. Cholecystostomy drain in place,     REVIEW OF SYSTEMS: All review of systems is negative for eye, ENT, GI, , allergic, dermatologic, musculoskeletal and neurologic except as described above    PAST MEDICAL & SURGICAL HISTORY:  HTN (hypertension)    Diabetes    CAD (coronary artery disease)    Cardiac pacemaker    S/P CABG (coronary artery bypass graft)    MEDICATIONS  (STANDING):  aspirin enteric coated 81 milliGRAM(s) Oral daily  atorvastatin 80 milliGRAM(s) Oral at bedtime  dextrose 40% Gel 15 Gram(s) Oral once  dextrose 5%. 1000 milliLiter(s) (50 mL/Hr) IV Continuous <Continuous>  dextrose 5%. 1000 milliLiter(s) (100 mL/Hr) IV Continuous <Continuous>  dextrose 50% Injectable 25 Gram(s) IV Push once  dextrose 50% Injectable 12.5 Gram(s) IV Push once  dextrose 50% Injectable 25 Gram(s) IV Push once  glucagon  Injectable 1 milliGRAM(s) IntraMuscular once  heparin   Injectable 5000 Unit(s) SubCutaneous every 12 hours  insulin lispro (ADMELOG) corrective regimen sliding scale   SubCutaneous three times a day before meals  metoprolol succinate ER 50 milliGRAM(s) Oral daily  piperacillin/tazobactam IVPB.. 3.375 Gram(s) IV Intermittent every 8 hours  sodium chloride 0.9%. 1000 milliLiter(s) (60 mL/Hr) IV Continuous <Continuous>    MEDICATIONS  (PRN):  acetaminophen   Tablet .. 650 milliGRAM(s) Oral every 6 hours PRN Temp greater or equal to 38C (100.4F)  morphine  - Injectable 2 milliGRAM(s) IV Push every 6 hours PRN Severe Pain (7 - 10)  oxycodone    5 mG/acetaminophen 325 mG 1 Tablet(s) Oral every 4 hours PRN Mild Pain (1 - 3)  oxycodone    5 mG/acetaminophen 325 mG 2 Tablet(s) Oral every 6 hours PRN Moderate Pain (4 - 6)    Allergies  No Known Allergies    Vital Signs Last 24 Hrs  T(C): 36.7 (07 May 2021 05:33), Max: 37 (06 May 2021 16:46)  T(F): 98.1 (07 May 2021 05:33), Max: 98.6 (06 May 2021 16:46)  HR: 73 (07 May 2021 05:33) (73 - 77)  BP: 158/81 (07 May 2021 05:33) (153/72 - 170/67)  BP(mean): --  RR: 18 (07 May 2021 05:33) (18 - 19)  SpO2: 97% (07 May 2021 05:33) (93% - 97%)  I&O's Summary    06 May 2021 07:01  -  07 May 2021 07:00  --------------------------------------------------------  IN: 400 mL / OUT: 1015 mL / NET: -615 mL       TELE: SR 60-70s   PHYSICAL EXAM:  Appearance: NAD, no distress, alert, Obese   HEENT: Moist Mucous Membranes, Anicteric  Cardiovascular: Regular rate and rhythm, Normal S1 S2, No JVD, No murmurs, No rubs, gallops or clicks  Respiratory: Non-labored, Clear to auscultation, No rales, No rhonchi, No wheezing.   Gastrointestinal:  Soft, Non-tender, + BS, + abdominal drain   Neurologic: Non-focal  Skin: Warm and dry, No visible rashes or ulcers, No ecchymosis, No cyanosis  Musculoskeletal: No clubbing, No cyanosis, No joint swelling/tenderness  Psychiatry: Mood & affect appropriate  Lymph: + peripheral edema chronic venous stasis skin changes.     LABS: All Labs Reviewed:                        10.8   7.46  )-----------( 233      ( 07 May 2021 07:09 )             32.1                         10.0   9.63  )-----------( 225      ( 06 May 2021 06:43 )             29.7                         10.3   14.56 )-----------( 219      ( 05 May 2021 07:09 )             30.9     07 May 2021 07:09    139    |  106    |  15     ----------------------------<  182    3.8     |  23     |  1.00   06 May 2021 06:43    140    |  107    |  19     ----------------------------<  157    4.4     |  24     |  1.20   05 May 2021 07:09    144    |  108    |  26     ----------------------------<  153    3.4     |  26     |  1.50     Ca    8.8        07 May 2021 07:09  Ca    8.2        06 May 2021 06:43  Ca    8.5        05 May 2021 07:09    TPro  7.6    /  Alb  2.2    /  TBili  0.8    /  DBili  x      /  AST  40     /  ALT  29     /  AlkPhos  142    05 May 2021 07:09  Creatine Kinase, Serum: 44 U/L (05-04-21 @ 12:07)  Troponin I, Serum: <.015 ng/mL (05-04-21 @ 12:07)  Creatine Kinase, Serum: 65 U/L (05-04-21 @ 05:14)    12 Lead ECG:   Ventricular Rate 82 BPM  Atrial Rate 82 BPM  P-R Interval 152 m  QRS Duration 92 ms  Q-T Interval 354 ms  QTC Calculation(Bazett) 413 ms  P Axis 59 degrees  R Axis 6 degrees  T Axis 62 degrees  Diagnosis Line Normal sinus rhythm  Nonspecific T wave abnormality  Abnormal ECG  Confirmed by david Belle ([id number]) on 5/4/2021 3:44:47 PM (05-03-21 @ 17:08)    < from: TTE Echo Complete w/o Contrast w/ Doppler (05.04.21 @ 11:16) >  Dimensions:  LA 3.7       Normal Values: 2.0 - 4.0 cm  Ao 2.8        Normal Values: 2.0 - 3.8 cm  SEPTUM 0.9       Normal Values: 0.6 - 1.2 cm  PWT 1.0       Normal Values: 0.6 - 1.1 cm  LVIDd 4.7         Normal Values: 3.0 - 5.6 cm  LVIDs 3.7         Normal Values: 1.8 - 4.0 cm    OBSERVATIONS:  Mitral Valve: Mild MR.  Aortic Valve/Aorta: Sclerotic trileaflet aortic valve with grossly normal opening.  Tricuspid Valve: Mild TR.  Pulmonic Valve: Not well-visualized  Left Atrium: normal  Right Atrium: Not well-visualized  Left Ventricle: normal LV size and systolic function, estimated LVEF of 55%.  Right Ventricle: Grossly normal size and systolic function. A device wire is noted within the right heart  Pericardium: no significant pericardial effusion.  Pulmonary/RV Pressure: estimated PA systolic pressure of 48 mmHg  LV diastolic dysfunction is present    IMPRESSION:  Normal left ventricular internal dimensions and systolic function, estimated LVEF of 55%.  Grossly normal RV size and systolic function. A device wire is noted within the right heart  Sclerotic trileaflet aortic valve, without AI.  Mild MR and TR.  No significant pericardial effusion.    < end of copied text >

## 2021-05-07 NOTE — PROGRESS NOTE ADULT - ATTENDING COMMENTS
I personally saw and examined the patient in detail.  I have spoken to the above provider regarding the assessment and plan of care.  I reviewed the above assessment and plan of care, and agree.  I have made changes in the body of the note where appropriate.
Seen/examined. agree with above.   no cardiac contraindication to proceeding with nydia
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     CAD s/p CABG x 4  - Patient has a significant cardiac history, now found to have dynamic changes on EKG (1 mm in V3, V4-5). When compared to prior EKG from his cardiologist Dr. Thurston's office in February of 2021, he had very mild T wave inversions in high lateral leads   - He is not complaining of active chest pain at this time, however he does endorse some worsening of his QUIROS since February of this year. This may be secondary to COPD/body habitus, however cannot rule out cardiac cause.   - He had a myocardial perfusion study in 11/2019 which showed so significant ischemia.  No stress test or cath since that time.   - Troponin  x  4 negative.   Dynamic changes on EKG may be secondary to underlying infectious cause/hypotension, however, cannot effectively rule out ischemia without further workup.   - Last TTE done at Dr Thurston's office in June of 2020 shows EF of 55% and no significant valvular disease. Given dynamic EKG changes and worsening QUIROS  - repeat echo showed normal LV & RV size and function EF 55%, mild MR and TR  - Would continue his home ASA, BB and statin   - Would hold home ARB in the setting of ANITA
I saw and examined the patient personally. Spoke with above provider regarding this case. I reviewed the above findings completely.  I agree with the above history, physical, and plan which I have edited where appropriate.     resume po lasix. cont med as described. Further cardiac workup will depend on clinical course.

## 2021-05-07 NOTE — PROGRESS NOTE ADULT - PROVIDER SPECIALTY LIST ADULT
Cardiology
Surgery
Cardiology
Cardiology
Nephrology
Surgery
Urology
Cardiology
Intervent Radiology
Nephrology
Nephrology
Urology
Surgery
Internal Medicine

## 2021-05-07 NOTE — DISCHARGE NOTE PROVIDER - HOSPITAL COURSE
Acute Cholecystitis POA  S/p Percutaneous Drainage of Gall Bladder  CAD  Cardiomyopathy  ANITA POA - resolved  HTN  DM  Right renal mass    stabel for dc with strict outpt fu    >35 minutes spent on discharge

## 2021-05-07 NOTE — PROGRESS NOTE ADULT - PROBLEM SELECTOR PLAN 4
hold oral agents  npo  riss per sliding scale
hold oral agents  npo  riss per sliding scale  ivf
hold oral agents  npo  riss per sliding scale  ivf

## 2021-05-07 NOTE — PROGRESS NOTE ADULT - SUBJECTIVE AND OBJECTIVE BOX
Patient is a 80y old  Male who presents with a chief complaint of right sided abd pain since friday with ekg changes in pcp office (07 May 2021 10:25)      INTERVAL HPI/OVERNIGHT EVENTS: pt stable, no new events await ct for renal    MEDICATIONS  (STANDING):  aspirin enteric coated 81 milliGRAM(s) Oral daily  atorvastatin 80 milliGRAM(s) Oral at bedtime  dextrose 40% Gel 15 Gram(s) Oral once  dextrose 5%. 1000 milliLiter(s) (50 mL/Hr) IV Continuous <Continuous>  dextrose 5%. 1000 milliLiter(s) (100 mL/Hr) IV Continuous <Continuous>  dextrose 50% Injectable 25 Gram(s) IV Push once  dextrose 50% Injectable 12.5 Gram(s) IV Push once  dextrose 50% Injectable 25 Gram(s) IV Push once  glucagon  Injectable 1 milliGRAM(s) IntraMuscular once  heparin   Injectable 5000 Unit(s) SubCutaneous every 12 hours  insulin lispro (ADMELOG) corrective regimen sliding scale   SubCutaneous three times a day before meals  metoprolol succinate ER 50 milliGRAM(s) Oral daily  piperacillin/tazobactam IVPB.. 3.375 Gram(s) IV Intermittent every 8 hours  sodium chloride 0.9%. 1000 milliLiter(s) (60 mL/Hr) IV Continuous <Continuous>    MEDICATIONS  (PRN):  acetaminophen   Tablet .. 650 milliGRAM(s) Oral every 6 hours PRN Temp greater or equal to 38C (100.4F)  morphine  - Injectable 2 milliGRAM(s) IV Push every 6 hours PRN Severe Pain (7 - 10)  oxycodone    5 mG/acetaminophen 325 mG 1 Tablet(s) Oral every 4 hours PRN Mild Pain (1 - 3)  oxycodone    5 mG/acetaminophen 325 mG 2 Tablet(s) Oral every 6 hours PRN Moderate Pain (4 - 6)      Allergies    No Known Allergies    Intolerances        REVIEW OF SYSTEMS:  CONSTITUTIONAL: No fever, weight loss, or fatigue  EYES: No eye pain, visual disturbances  ENMT:  No difficulty hearing, tinnitus, vertigo; No sinus or throat pain  NECK: No pain or stiffness  RESPIRATORY: No cough, wheezing, chills or hemoptysis; No shortness of breath  CARDIOVASCULAR: No chest pain, palpitations, dizziness  GASTROINTESTINAL: No abdominal or epigastric pain. No nausea, vomiting, or hematemesis; No diarrhea or constipation. No melena or hematochezia.  GENITOURINARY: No dysuria, frequency, hematuria, or incontinence  NEUROLOGICAL: No headaches, memory loss, loss of strength, numbness, or tremors  SKIN: No itching, burning  LYMPH NODES: No enlarged glands  MUSCULOSKELETAL: No joint pain or swelling; No muscle, back, or extremity pain  PSYCHIATRIC: No depression, mood swings  HEME/LYMPH: No easy bruising, or bleeding gums  ALLERGY AND IMMUNOLOGIC: No hives    Vital Signs Last 24 Hrs  T(C): 36.7 (07 May 2021 05:33), Max: 37 (06 May 2021 16:46)  T(F): 98.1 (07 May 2021 05:33), Max: 98.6 (06 May 2021 16:46)  HR: 73 (07 May 2021 05:33) (73 - 77)  BP: 158/81 (07 May 2021 05:33) (153/72 - 170/67)  BP(mean): --  RR: 18 (07 May 2021 05:33) (18 - 19)  SpO2: 97% (07 May 2021 05:33) (93% - 97%)    PHYSICAL EXAM:  GENERAL: NAD, well-groomed, well-developed  HEAD:  Atraumatic, Normocephalic  EYES: EOMI, PERRLA, conjunctiva and sclera clear  ENMT: No tonsillar erythema, exudates, or enlargement   NECK: Supple, No JVD  NERVOUS SYSTEM:  Alert & Oriented X3, Good concentration  CHEST/LUNG: Clear to auscultation bilaterally; No rales, rhonchi, wheezing  HEART: Regular rate and rhythm  ABDOMEN: soft, nt, drain in place draing well  EXTREMITIES:  2+ Peripheral Pulses   LYMPH: No lymphadenopathy noted  SKIN: No rashes     LABS:                        10.8   7.46  )-----------( 233      ( 07 May 2021 07:09 )             32.1     07 May 2021 07:09    139    |  106    |  15     ----------------------------<  182    3.8     |  23     |  1.00     Ca    8.8        07 May 2021 07:09          CAPILLARY BLOOD GLUCOSE      POCT Blood Glucose.: 166 mg/dL (07 May 2021 08:27)  POCT Blood Glucose.: 247 mg/dL (06 May 2021 21:54)  POCT Blood Glucose.: 181 mg/dL (06 May 2021 16:57)  POCT Blood Glucose.: 207 mg/dL (06 May 2021 11:49)    blood culture --   No growth   05-05 @ 16:26    blood culture --   >100,000 CFU/ml Enterobacter cloacae complex   05-03 @ 21:21    blood culture --   No growth to date.   05-03 @ 21:09      urine culture --  05-05 @ 16:26  results   No growth 05-05 @ 16:26  urine culture --  05-03 @ 21:21  results   >100,000 CFU/ml Enterobacter cloacae complex 05-03 @ 21:21  urine culture --  05-03 @ 21:09  results   No growth to date. 05-03 @ 21:09    wound with gram statin --    05-05 @ 16:26  organism  --   05-05 @ 16:26  specimen source .Body Fluid Bile Fluid  05-05 @ 16:26  wound with gram statin --    05-03 @ 21:21  organism  Enterobacter cloacae complex   05-03 @ 21:21  specimen source .Urine Clean Catch (Midstream)  05-03 @ 21:21  wound with gram statin --    05-03 @ 21:09  organism  --   05-03 @ 21:09  specimen source .Blood Blood-Peripheral  05-03 @ 21:09      RADIOLOGY & ADDITIONAL TESTS:      Consultant(s) Notes Reviewed:  [ x] YES  [ ] NO    Care Discussed with Consultants/Other Providers [x] YES  [ ] NO    Advanced care planning discussed with patient and family, advanced care planning forms reviewed, discussed, and completed.  20 minutes spent.

## 2021-05-07 NOTE — PROGRESS NOTE ADULT - SUBJECTIVE AND OBJECTIVE BOX
Patient is a 80y old  Male who presents with a chief complaint of right sided abd pain since friday with ekg changes in pcp office (05 May 2021 13:12)  Patient seen in follow up for ANITA.        PAST MEDICAL HISTORY:  HTN (hypertension)    Diabetes    CAD (coronary artery disease)    Cardiac pacemaker      MEDICATIONS  (STANDING):  aspirin enteric coated 81 milliGRAM(s) Oral daily  atorvastatin 80 milliGRAM(s) Oral at bedtime  dextrose 40% Gel 15 Gram(s) Oral once  dextrose 5%. 1000 milliLiter(s) (50 mL/Hr) IV Continuous <Continuous>  dextrose 5%. 1000 milliLiter(s) (100 mL/Hr) IV Continuous <Continuous>  dextrose 50% Injectable 25 Gram(s) IV Push once  dextrose 50% Injectable 12.5 Gram(s) IV Push once  dextrose 50% Injectable 25 Gram(s) IV Push once  glucagon  Injectable 1 milliGRAM(s) IntraMuscular once  heparin   Injectable 5000 Unit(s) SubCutaneous every 12 hours  insulin lispro (ADMELOG) corrective regimen sliding scale   SubCutaneous three times a day before meals  metoprolol succinate ER 50 milliGRAM(s) Oral daily  piperacillin/tazobactam IVPB.. 3.375 Gram(s) IV Intermittent every 8 hours  sodium chloride 0.9%. 1000 milliLiter(s) (60 mL/Hr) IV Continuous <Continuous>    MEDICATIONS  (PRN):  acetaminophen   Tablet .. 650 milliGRAM(s) Oral every 6 hours PRN Temp greater or equal to 38C (100.4F)  morphine  - Injectable 2 milliGRAM(s) IV Push every 6 hours PRN Severe Pain (7 - 10)  oxycodone    5 mG/acetaminophen 325 mG 1 Tablet(s) Oral every 4 hours PRN Mild Pain (1 - 3)  oxycodone    5 mG/acetaminophen 325 mG 2 Tablet(s) Oral every 6 hours PRN Moderate Pain (4 - 6)    T(C): 36.7 (05-07-21 @ 05:33), Max: 37.4 (05-05-21 @ 17:06)  HR: 73 (05-07-21 @ 05:33) (73 - 80)  BP: 158/81 (05-07-21 @ 05:33) (145/71 - 170/67)  RR: 18 (05-07-21 @ 05:33)  SpO2: 97% (05-07-21 @ 05:33)  Wt(kg): --  I&O's Detail    06 May 2021 07:01  -  07 May 2021 07:00  --------------------------------------------------------  IN:    IV PiggyBack: 100 mL    Oral Fluid: 300 mL  Total IN: 400 mL    OUT:    Drain (mL): 115 mL    Voided (mL): 900 mL  Total OUT: 1015 mL    Total NET: -615 mL                PHYSICAL EXAM:  General: No distress  Respiratory: b/l air entry  Cardiovascular: S1 S2  Gastrointestinal: soft, + drain  Extremities:  edema                                 LABORATORY:                        10.8   7.46  )-----------( 233      ( 07 May 2021 07:09 )             32.1     05-07    139  |  106  |  15  ----------------------------<  182<H>  3.8   |  23  |  1.00    Ca    8.8      07 May 2021 07:09      Sodium, Serum: 139 mmol/L (05-07 @ 07:09)  Sodium, Serum: 140 mmol/L (05-06 @ 06:43)    Potassium, Serum: 3.8 mmol/L (05-07 @ 07:09)  Potassium, Serum: 4.4 mmol/L (05-06 @ 06:43)    Hemoglobin: 10.8 g/dL (05-07 @ 07:09)  Hemoglobin: 10.0 g/dL (05-06 @ 06:43)  Hemoglobin: 10.3 g/dL (05-05 @ 07:09)    Creatinine, Serum 1.00 (05-07 @ 07:09)  Creatinine, Serum 1.20 (05-06 @ 06:43)  Creatinine, Serum 1.50 (05-05 @ 07:09)

## 2021-05-07 NOTE — DISCHARGE NOTE PROVIDER - NSDCCPCAREPLAN_GEN_ALL_CORE_FT
PRINCIPAL DISCHARGE DIAGNOSIS  Diagnosis: Cholecystitis  Assessment and Plan of Treatment: finish course of abx  fu with dr davidson in 3-5 days for drain fu      SECONDARY DISCHARGE DIAGNOSES  Diagnosis: Right renal mass  Assessment and Plan of Treatment: fu with urology in one week  on discharge papers

## 2021-05-07 NOTE — DISCHARGE NOTE PROVIDER - NSDCMRMEDTOKEN_GEN_ALL_CORE_FT
Actamin 325 mg oral tablet: 2 tab(s) orally every 6 hours, As needed, Temp greater or equal to 38C (100.4F)  amLODIPine 10 mg oral tablet: 1 tab(s) orally once a day  aspirin 81 mg oral tablet: 1  orally once a day  atorvastatin 80 mg oral tablet: 1 tab(s) orally once a day  cefpodoxime 100 mg oral tablet: 1 tab(s) orally every 12 hours  Farxiga 10 mg oral tablet: 1 tab(s) orally once a day (in the morning)  hydroCHLOROthiazide 25 mg oral tablet: 1 tab(s) orally once a day  losartan 100 mg oral tablet: 1 tab(s) orally once a day  metFORMIN 1000 mg oral tablet: 1 tab(s) orally 2 times a day, resume on 5/9/21 evening   Metoprolol Succinate ER 50 mg oral tablet, extended release: 1 tab(s) orally once a day  metroNIDAZOLE 500 mg oral tablet: 1 tab(s) orally every 8 hours

## 2021-05-07 NOTE — PROGRESS NOTE ADULT - PROBLEM SELECTOR PLAN 2
cardiology eval noted  cardiac enzymes are negative for 3 sets  echo from 2020 noted  medications and cardiac management per cardiology recs

## 2021-05-07 NOTE — DISCHARGE NOTE PROVIDER - CARE PROVIDER_API CALL
Brant Welsh  SURGERY  [address], [address]  [place] [address]  Phone: [phone]  Fax: [phone]  Follow Up Time: 1 week    Diana Simpson)  Urology  [address] [place] [address], [address]  [place] [address]  Phone: [phone]  Fax: [phone]  Follow Up Time: 1 week

## 2021-05-08 LAB
CULTURE RESULTS: SIGNIFICANT CHANGE UP
CULTURE RESULTS: SIGNIFICANT CHANGE UP
SPECIMEN SOURCE: SIGNIFICANT CHANGE UP
SPECIMEN SOURCE: SIGNIFICANT CHANGE UP

## 2021-05-10 LAB
CULTURE RESULTS: SIGNIFICANT CHANGE UP
SPECIMEN SOURCE: SIGNIFICANT CHANGE UP

## 2023-03-15 NOTE — ED ADULT TRIAGE NOTE - ARRIVAL FROM
Pt brought in disc from [place] for MRI Right hip Dated 12/1/22  CTA from [facility] dated 8/5/22  MRI Lumbar Spine Without Contrast 8/29/22. Along with reports. Disc were uploaded to pacs and were returned to pt at time of visit.
Doctor's office
